# Patient Record
Sex: FEMALE | Race: WHITE | NOT HISPANIC OR LATINO | URBAN - METROPOLITAN AREA
[De-identification: names, ages, dates, MRNs, and addresses within clinical notes are randomized per-mention and may not be internally consistent; named-entity substitution may affect disease eponyms.]

---

## 2018-10-23 ENCOUNTER — EMERGENCY (EMERGENCY)
Facility: HOSPITAL | Age: 73
LOS: 1 days | Discharge: HOME | End: 2018-10-23
Attending: EMERGENCY MEDICINE
Payer: MEDICARE

## 2018-10-23 VITALS
OXYGEN SATURATION: 96 % | RESPIRATION RATE: 18 BRPM | HEART RATE: 70 BPM | TEMPERATURE: 98 F | SYSTOLIC BLOOD PRESSURE: 120 MMHG | DIASTOLIC BLOOD PRESSURE: 78 MMHG

## 2018-10-23 DIAGNOSIS — Z98.890 OTHER SPECIFIED POSTPROCEDURAL STATES: Chronic | ICD-10-CM

## 2018-10-23 PROCEDURE — 99283 EMERGENCY DEPT VISIT LOW MDM: CPT

## 2018-10-23 NOTE — ED PROVIDER NOTE - PLAN OF CARE
The patient was discharged from the ED in stable condition. All results of today's workup were discussed with the patient and all questions/concerns were addressed. All discharge instructions were thoroughly verbally discussed with the patient, as well as important warning signs and new/ worsening symptoms which should necessitate patient's immediate return to the ED. The patient is agreeable with discharge and expresses full understanding of all instructions given.

## 2018-10-23 NOTE — ED PROVIDER NOTE - PROGRESS NOTE DETAILS
Rosalia Hernandez MD PGY-1 Will place pt in ACE bandage for compression. Advised to avoid compression stockings until sees vascular surgeon in 24-48 hours, as compression stockings may worsen symptoms. No active bleeding. Safe for d/c with f/u

## 2018-10-23 NOTE — ED ADULT NURSE NOTE - NSIMPLEMENTINTERV_GEN_ALL_ED
Implemented All Fall Risk Interventions:  [place] to call system. Call bell, personal items and telephone within reach. Instruct patient to call for assistance. Room bathroom lighting operational. Non-slip footwear when patient is off stretcher. Physically safe environment: no spills, clutter or unnecessary equipment. Stretcher in lowest position, wheels locked, appropriate side rails in place. Provide visual cue, wrist band, yellow gown, etc. Monitor gait and stability. Monitor for mental status changes and reorient to person, place, and time. Review medications for side effects contributing to fall risk. Reinforce activity limits and safety measures with patient and family.

## 2018-10-23 NOTE — ED PROCEDURE NOTE - PROCEDURE ADDITIONAL DETAILS
Patient tolerated procedure well. Neurovascularly intact pre and post ACE bandage. Instructed to maintain ACE bandage for compression until follow up with vascular surgeon within 1-2 days.

## 2018-10-23 NOTE — ED ADULT NURSE NOTE - OBJECTIVE STATEMENT
74 yo presents to the ED from home by EMS with daughter at bedside. A&Ox4 c/o L varicose vein bleeding. pt reports that she was putting her socks on at 1845 and "heard a pop and saw a lot of blood". pt reports bleeding stopped with pressure applied. pt denies numbness, tingling. PMS intact bilaterally. on aspirin 81mg daily. reports dizziness. gross neuro intact. denies CP. VSS. well appearing.

## 2018-10-23 NOTE — ED PROVIDER NOTE - OBJECTIVE STATEMENT
Rosalia Hernandez MD PGY-1 Pt is a 72 y/o female with PMH HLD (on lipitor), gastritis, hx cardiac ablation, on ASA presents BIBEMS for "popped varicose veins". Pt states that around 7PM she was putting her socks on, and felt a "gush", resulting in a lot of bleeding from site, per daughter. Called EMS, bleeding controlled with direct pressure in field by EMS. Never had this happen before, was feeling anxious at time of bleeding. States noticed "blue/black" lesion at site of bleeding for past few months, not increasing in size. Denies chest pain, SOB, dizziness prior to event.

## 2018-10-23 NOTE — ED PROVIDER NOTE - CARE PLAN
Principal Discharge DX:	Varicose veins with complications Principal Discharge DX:	Varicose veins with complications  Assessment and plan of treatment:	The patient was discharged from the ED in stable condition. All results of today's workup were discussed with the patient and all questions/concerns were addressed. All discharge instructions were thoroughly verbally discussed with the patient, as well as important warning signs and new/ worsening symptoms which should necessitate patient's immediate return to the ED. The patient is agreeable with discharge and expresses full understanding of all instructions given. Principal Discharge DX:	Varicose veins of left lower extremity, unspecified whether complicated  Assessment and plan of treatment:	The patient was discharged from the ED in stable condition. All results of today's workup were discussed with the patient and all questions/concerns were addressed. All discharge instructions were thoroughly verbally discussed with the patient, as well as important warning signs and new/ worsening symptoms which should necessitate patient's immediate return to the ED. The patient is agreeable with discharge and expresses full understanding of all instructions given.

## 2018-10-23 NOTE — ED PROVIDER NOTE - PHYSICAL EXAMINATION
PHYSICAL EXAM:   General: well-appearing female  who appears stated age, in no acute distress  HEENT: normocephalic, atraumatic, no conjunctival erythema,   Cardiovascular: + S1/S2,   Respiratory: clear to auscultation bilaterally, nonlabored respirations  Abdominal: soft, nontender  Extremities: +LE varicose veins b/l, L>R. Punctate lesion with dried blood on skin over left lateral malleolus. No active bleeding. Motor, sensation and DP pulses in tact  Neuro: Alert and oriented x3.   Psychiatric: appropriate mood and affect.   Skin: appropriate color, warm, dry, except as noted above  -Rosalia Hernandez PGY-1

## 2018-10-23 NOTE — ED PROVIDER NOTE - NS ED ROS FT
REVIEW OF SYSTEMS:  General: no fever, no chills  HEENT: no headache  Cardiac: no chest pain  Respiratory: no shortness of breath  Gastrointestinal: no abdominal pain, no nausea, no vomiting,   Extremities: +LE varicose veins, L>R  Neuro: no numbness/tingling of the extremities, no decreased sensation  Endo: no known diabetes  Skin: no rashes  -Rosalia Hernandez PGY-1

## 2018-10-23 NOTE — ED PROVIDER NOTE - MEDICAL DECISION MAKING DETAILS
Rosalia Hernandez MD PGY-1 74 y/o female PMH cardiac ablation, gastritis, HTN on ASA BIBEMS for "popped varicose veins". Bleeding controlled with direct pressure by EMS. Will perform wound care.

## 2018-10-23 NOTE — ED PROVIDER NOTE - PRINCIPAL DIAGNOSIS
Varicose veins with complications Varicose veins of left lower extremity, unspecified whether complicated

## 2021-03-20 ENCOUNTER — EMERGENCY (EMERGENCY)
Facility: HOSPITAL | Age: 76
LOS: 1 days | Discharge: HOME | End: 2021-03-20
Attending: STUDENT IN AN ORGANIZED HEALTH CARE EDUCATION/TRAINING PROGRAM
Payer: MEDICARE

## 2021-03-20 VITALS
RESPIRATION RATE: 18 BRPM | SYSTOLIC BLOOD PRESSURE: 114 MMHG | WEIGHT: 182.1 LBS | HEIGHT: 63 IN | HEART RATE: 86 BPM | DIASTOLIC BLOOD PRESSURE: 74 MMHG | TEMPERATURE: 99 F | OXYGEN SATURATION: 98 %

## 2021-03-20 DIAGNOSIS — Z98.890 OTHER SPECIFIED POSTPROCEDURAL STATES: Chronic | ICD-10-CM

## 2021-03-20 PROBLEM — F32.9 MAJOR DEPRESSIVE DISORDER, SINGLE EPISODE, UNSPECIFIED: Chronic | Status: ACTIVE | Noted: 2018-10-23

## 2021-03-20 PROBLEM — F41.9 ANXIETY DISORDER, UNSPECIFIED: Chronic | Status: ACTIVE | Noted: 2018-10-23

## 2021-03-20 PROBLEM — E78.5 HYPERLIPIDEMIA, UNSPECIFIED: Chronic | Status: ACTIVE | Noted: 2018-10-23

## 2021-03-20 LAB
ALBUMIN SERPL ELPH-MCNC: 4.4 G/DL — SIGNIFICANT CHANGE UP (ref 3.3–5)
ALP SERPL-CCNC: 133 U/L — HIGH (ref 40–120)
ALT FLD-CCNC: 34 U/L — SIGNIFICANT CHANGE UP (ref 10–45)
ANION GAP SERPL CALC-SCNC: 14 MMOL/L — SIGNIFICANT CHANGE UP (ref 5–17)
APPEARANCE UR: CLEAR — SIGNIFICANT CHANGE UP
APTT BLD: 29.4 SEC — SIGNIFICANT CHANGE UP (ref 27.5–35.5)
AST SERPL-CCNC: 42 U/L — HIGH (ref 10–40)
BACTERIA # UR AUTO: NEGATIVE — SIGNIFICANT CHANGE UP
BASOPHILS # BLD AUTO: 0.03 K/UL — SIGNIFICANT CHANGE UP (ref 0–0.2)
BASOPHILS NFR BLD AUTO: 0.7 % — SIGNIFICANT CHANGE UP (ref 0–2)
BILIRUB SERPL-MCNC: 0.5 MG/DL — SIGNIFICANT CHANGE UP (ref 0.2–1.2)
BILIRUB UR-MCNC: NEGATIVE — SIGNIFICANT CHANGE UP
BUN SERPL-MCNC: 12 MG/DL — SIGNIFICANT CHANGE UP (ref 7–23)
CALCIUM SERPL-MCNC: 9.7 MG/DL — SIGNIFICANT CHANGE UP (ref 8.4–10.5)
CHLORIDE SERPL-SCNC: 100 MMOL/L — SIGNIFICANT CHANGE UP (ref 96–108)
CO2 SERPL-SCNC: 23 MMOL/L — SIGNIFICANT CHANGE UP (ref 22–31)
COLOR SPEC: YELLOW — SIGNIFICANT CHANGE UP
CREAT SERPL-MCNC: 0.91 MG/DL — SIGNIFICANT CHANGE UP (ref 0.5–1.3)
CRP SERPL-MCNC: 0.76 MG/DL — HIGH (ref 0–0.4)
D DIMER BLD IA.RAPID-MCNC: 1569 NG/ML DDU — HIGH
DIFF PNL FLD: NEGATIVE — SIGNIFICANT CHANGE UP
EOSINOPHIL # BLD AUTO: 0.09 K/UL — SIGNIFICANT CHANGE UP (ref 0–0.5)
EOSINOPHIL NFR BLD AUTO: 2.2 % — SIGNIFICANT CHANGE UP (ref 0–6)
EPI CELLS # UR: 1 /HPF — SIGNIFICANT CHANGE UP
GLUCOSE SERPL-MCNC: 98 MG/DL — SIGNIFICANT CHANGE UP (ref 70–99)
GLUCOSE UR QL: NEGATIVE — SIGNIFICANT CHANGE UP
HCT VFR BLD CALC: 37.9 % — SIGNIFICANT CHANGE UP (ref 34.5–45)
HGB BLD-MCNC: 12.4 G/DL — SIGNIFICANT CHANGE UP (ref 11.5–15.5)
HYALINE CASTS # UR AUTO: 1 /LPF — SIGNIFICANT CHANGE UP (ref 0–2)
IMM GRANULOCYTES NFR BLD AUTO: 0.7 % — SIGNIFICANT CHANGE UP (ref 0–1.5)
INR BLD: 1.16 RATIO — SIGNIFICANT CHANGE UP (ref 0.88–1.16)
KETONES UR-MCNC: NEGATIVE — SIGNIFICANT CHANGE UP
LEUKOCYTE ESTERASE UR-ACNC: NEGATIVE — SIGNIFICANT CHANGE UP
LYMPHOCYTES # BLD AUTO: 0.97 K/UL — LOW (ref 1–3.3)
LYMPHOCYTES # BLD AUTO: 23.4 % — SIGNIFICANT CHANGE UP (ref 13–44)
MCHC RBC-ENTMCNC: 25.4 PG — LOW (ref 27–34)
MCHC RBC-ENTMCNC: 32.7 GM/DL — SIGNIFICANT CHANGE UP (ref 32–36)
MCV RBC AUTO: 77.5 FL — LOW (ref 80–100)
MONOCYTES # BLD AUTO: 0.51 K/UL — SIGNIFICANT CHANGE UP (ref 0–0.9)
MONOCYTES NFR BLD AUTO: 12.3 % — SIGNIFICANT CHANGE UP (ref 2–14)
NEUTROPHILS # BLD AUTO: 2.52 K/UL — SIGNIFICANT CHANGE UP (ref 1.8–7.4)
NEUTROPHILS NFR BLD AUTO: 60.7 % — SIGNIFICANT CHANGE UP (ref 43–77)
NITRITE UR-MCNC: NEGATIVE — SIGNIFICANT CHANGE UP
NRBC # BLD: 0 /100 WBCS — SIGNIFICANT CHANGE UP (ref 0–0)
PH UR: 6 — SIGNIFICANT CHANGE UP (ref 5–8)
PLATELET # BLD AUTO: 282 K/UL — SIGNIFICANT CHANGE UP (ref 150–400)
POTASSIUM SERPL-MCNC: 3.7 MMOL/L — SIGNIFICANT CHANGE UP (ref 3.5–5.3)
POTASSIUM SERPL-SCNC: 3.7 MMOL/L — SIGNIFICANT CHANGE UP (ref 3.5–5.3)
PROCALCITONIN SERPL-MCNC: 0.18 NG/ML — HIGH (ref 0.02–0.1)
PROT SERPL-MCNC: 7.5 G/DL — SIGNIFICANT CHANGE UP (ref 6–8.3)
PROT UR-MCNC: NEGATIVE — SIGNIFICANT CHANGE UP
PROTHROM AB SERPL-ACNC: 13.8 SEC — HIGH (ref 10.6–13.6)
RBC # BLD: 4.89 M/UL — SIGNIFICANT CHANGE UP (ref 3.8–5.2)
RBC # FLD: 13.4 % — SIGNIFICANT CHANGE UP (ref 10.3–14.5)
RBC CASTS # UR COMP ASSIST: 1 /HPF — SIGNIFICANT CHANGE UP (ref 0–4)
SODIUM SERPL-SCNC: 137 MMOL/L — SIGNIFICANT CHANGE UP (ref 135–145)
SP GR SPEC: 1.01 — SIGNIFICANT CHANGE UP (ref 1.01–1.02)
TROPONIN T, HIGH SENSITIVITY RESULT: 7 NG/L — SIGNIFICANT CHANGE UP (ref 0–51)
UROBILINOGEN FLD QL: NEGATIVE — SIGNIFICANT CHANGE UP
WBC # BLD: 4.15 K/UL — SIGNIFICANT CHANGE UP (ref 3.8–10.5)
WBC # FLD AUTO: 4.15 K/UL — SIGNIFICANT CHANGE UP (ref 3.8–10.5)
WBC UR QL: 2 /HPF — SIGNIFICANT CHANGE UP (ref 0–5)

## 2021-03-20 PROCEDURE — 71045 X-RAY EXAM CHEST 1 VIEW: CPT | Mod: 26

## 2021-03-20 PROCEDURE — 99285 EMERGENCY DEPT VISIT HI MDM: CPT | Mod: CS

## 2021-03-20 PROCEDURE — 93010 ELECTROCARDIOGRAM REPORT: CPT

## 2021-03-20 RX ORDER — FAMOTIDINE 10 MG/ML
20 INJECTION INTRAVENOUS ONCE
Refills: 0 | Status: COMPLETED | OUTPATIENT
Start: 2021-03-20 | End: 2021-03-20

## 2021-03-20 RX ORDER — SODIUM CHLORIDE 9 MG/ML
1000 INJECTION INTRAMUSCULAR; INTRAVENOUS; SUBCUTANEOUS ONCE
Refills: 0 | Status: COMPLETED | OUTPATIENT
Start: 2021-03-20 | End: 2021-03-20

## 2021-03-20 RX ORDER — ONDANSETRON 8 MG/1
4 TABLET, FILM COATED ORAL ONCE
Refills: 0 | Status: COMPLETED | OUTPATIENT
Start: 2021-03-20 | End: 2021-03-20

## 2021-03-20 RX ADMIN — SODIUM CHLORIDE 1000 MILLILITER(S): 9 INJECTION INTRAMUSCULAR; INTRAVENOUS; SUBCUTANEOUS at 21:52

## 2021-03-20 RX ADMIN — ONDANSETRON 4 MILLIGRAM(S): 8 TABLET, FILM COATED ORAL at 20:18

## 2021-03-20 RX ADMIN — FAMOTIDINE 20 MILLIGRAM(S): 10 INJECTION INTRAVENOUS at 20:18

## 2021-03-20 NOTE — ED PROVIDER NOTE - PATIENT PORTAL LINK FT
You can access the FollowMyHealth Patient Portal offered by [facility] by registering at the following website: [email or web address]. By joining [employer]’s FollowMyHealth portal, you will also be able to view your health information using other applications (apps) compatible with our system.

## 2021-03-20 NOTE — ED PROVIDER NOTE - NSFOLLOWUPINSTRUCTIONS_ED_ALL_ED_FT
Please follow up with pulmonary over the next 2-4 weeks for further management of the findings seen on the CT scan of the chest.   If you develop difficulty breathing, lightheadedness, dizziness, feeling like you will pass out, nausea vomiting, confusion or any other concerning symptoms please return to the emergency department.

## 2021-03-20 NOTE — ED PROVIDER NOTE - NSFOLLOWUPCLINICS_GEN_ALL_ED_FT
[facility]  Pulmonary Medicine  [address] [place] [address]  Phone: [phone]  Fax: [phone]  Follow Up Time:

## 2021-03-20 NOTE — ED PROVIDER NOTE - PROGRESS NOTE DETAILS
Pt stated feeling much better after medications and awaiting for CTA chest. ap- spoke w/ daughter and patient who both prefer to go home, pt currently feeling much improved while in the hospital, prefers to go home w/ outpt follow up. Will give dose of antibiotics while in the er. Pt w/ no cp, no sob, is ambulatory in the dpeartment w/out difficulty, pt curb 65 of 1 will trial outpt antibiotics, pt to follow up with pulmonary

## 2021-03-20 NOTE — ED PROVIDER NOTE - CLINICAL SUMMARY MEDICAL DECISION MAKING FREE TEXT BOX
75 F w/ hx of HTN, HLD, anxiety and depresison p/w cough, cp/back pain poor oral intake and nausea for 1 week reports sx started on 3/13, has had negative rapid covid test, went to  on 3/13--> returned on 3/15 for results and had a UA w/ <50k strep, was on azithromycin pt family friend, pt w/ decreased po intake, no cp, no sob, brought in by daughter to the er to "figure out what is wrong" "run all the tests" pt w/ fatigue, concern for possible covid 19 infection, lungs w/ decreased breath sounds bilaterally, no abodminal tenderness, no cva tenderness, ambulatory in the department w/out difficulty, no TATUM, pt to have screening covid 19 labs, pt w/ elevated ddimer, will have labs, ekg and CTPE scan, UA negative for infection, pt to be treated w/ oral antibiotics for possible bacterial pneumonia, PT to follow up with pulmonary for further management of symptoms

## 2021-03-20 NOTE — ED PROVIDER NOTE - PHYSICAL EXAMINATION
NAD, VSS, Afebrile, Neck supple. Lungs clear. Mild epigastric tender. No RUQ/LUQ/RLQ and LLQ tender. No CVA tender. No peripheral edema. Neuro- intact.

## 2021-03-20 NOTE — ED PROVIDER NOTE - ATTENDING CONTRIBUTION TO CARE
12 days hasn't been eating, decreased   started a z pack and ended today   huber (daughter) [phone]        will have labs, and xray

## 2021-03-20 NOTE — ED PROVIDER NOTE - DISCHARGE DATE
Pt BIB via EMS, c/o generalized weakness with decrease appetite w/ hx of COPD and asthma. Pt was at PCP went felt weak , low BP at office sent here for evaluation. In no acute distress, denies feeling SOB or difficulty breathing. shyla starr 21-Mar-2021

## 2021-03-20 NOTE — ED PROVIDER NOTE - OBJECTIVE STATEMENT
76yo female pt with pMHx of HTN, HLD, Anxiety/Depression presents to ED with cough, CP/back pain, poor oral intake and nausea for one week. Pt stated she's had cough, poor oral intake, nausea with CP/back pain since 3/13/21 and tested COVID- in UC. She spoked to PMD 5days ago and completed Z-pack today with some improvement. Reported she's still unable to tolerate po intake due to nausea and upset stomach and feeling weak. Denies fever, chills, or sore throat. Denies SOB. Denies V/D. Denies urinary problems. Denies sensory changes or weakness to extremities.

## 2021-03-21 VITALS
TEMPERATURE: 98 F | SYSTOLIC BLOOD PRESSURE: 118 MMHG | HEART RATE: 84 BPM | DIASTOLIC BLOOD PRESSURE: 88 MMHG | OXYGEN SATURATION: 99 % | RESPIRATION RATE: 17 BRPM

## 2021-03-21 LAB — SARS-COV-2 RNA SPEC QL NAA+PROBE: SIGNIFICANT CHANGE UP

## 2021-03-21 PROCEDURE — 84484 ASSAY OF TROPONIN QUANT: CPT

## 2021-03-21 PROCEDURE — 85610 PROTHROMBIN TIME: CPT

## 2021-03-21 PROCEDURE — 93005 ELECTROCARDIOGRAM TRACING: CPT

## 2021-03-21 PROCEDURE — 86140 C-REACTIVE PROTEIN: CPT

## 2021-03-21 PROCEDURE — 96375 TX/PRO/DX INJ NEW DRUG ADDON: CPT

## 2021-03-21 PROCEDURE — 84145 PROCALCITONIN (PCT): CPT

## 2021-03-21 PROCEDURE — 71045 X-RAY EXAM CHEST 1 VIEW: CPT

## 2021-03-21 PROCEDURE — U0003: CPT

## 2021-03-21 PROCEDURE — 85025 COMPLETE CBC W/AUTO DIFF WBC: CPT

## 2021-03-21 PROCEDURE — 71275 CT ANGIOGRAPHY CHEST: CPT | Mod: 26,MA

## 2021-03-21 PROCEDURE — 85379 FIBRIN DEGRADATION QUANT: CPT

## 2021-03-21 PROCEDURE — 85730 THROMBOPLASTIN TIME PARTIAL: CPT

## 2021-03-21 PROCEDURE — 96374 THER/PROPH/DIAG INJ IV PUSH: CPT | Mod: XU

## 2021-03-21 PROCEDURE — U0005: CPT

## 2021-03-21 PROCEDURE — 81001 URINALYSIS AUTO W/SCOPE: CPT

## 2021-03-21 PROCEDURE — 71275 CT ANGIOGRAPHY CHEST: CPT

## 2021-03-21 PROCEDURE — 99285 EMERGENCY DEPT VISIT HI MDM: CPT | Mod: 25

## 2021-03-21 PROCEDURE — 80053 COMPREHEN METABOLIC PANEL: CPT

## 2021-03-21 PROCEDURE — 83690 ASSAY OF LIPASE: CPT

## 2021-03-21 PROCEDURE — 87086 URINE CULTURE/COLONY COUNT: CPT

## 2021-03-21 RX ORDER — CEFTRIAXONE 500 MG/1
1000 INJECTION, POWDER, FOR SOLUTION INTRAMUSCULAR; INTRAVENOUS ONCE
Refills: 0 | Status: COMPLETED | OUTPATIENT
Start: 2021-03-21 | End: 2021-03-21

## 2021-03-21 RX ORDER — CEFPODOXIME PROXETIL 100 MG
1 TABLET ORAL
Qty: 14 | Refills: 0
Start: 2021-03-21 | End: 2021-03-27

## 2021-03-21 RX ADMIN — CEFTRIAXONE 100 MILLIGRAM(S): 500 INJECTION, POWDER, FOR SOLUTION INTRAMUSCULAR; INTRAVENOUS at 02:18

## 2021-03-21 RX ADMIN — Medication 100 MILLIGRAM(S): at 02:18

## 2021-03-22 LAB
CULTURE RESULTS: SIGNIFICANT CHANGE UP
SPECIMEN SOURCE: SIGNIFICANT CHANGE UP

## 2021-03-31 PROBLEM — Z00.00 ENCOUNTER FOR PREVENTIVE HEALTH EXAMINATION: Status: ACTIVE | Noted: 2021-03-31

## 2021-04-01 ENCOUNTER — APPOINTMENT (OUTPATIENT)
Dept: PULMONOLOGY | Facility: CLINIC | Age: 76
End: 2021-04-01
Payer: MEDICARE

## 2021-04-01 VITALS
OXYGEN SATURATION: 99 % | HEART RATE: 78 BPM | HEIGHT: 64 IN | RESPIRATION RATE: 16 BRPM | WEIGHT: 173 LBS | DIASTOLIC BLOOD PRESSURE: 74 MMHG | BODY MASS INDEX: 29.53 KG/M2 | SYSTOLIC BLOOD PRESSURE: 109 MMHG | TEMPERATURE: 97.6 F

## 2021-04-01 DIAGNOSIS — R42 DIZZINESS AND GIDDINESS: ICD-10-CM

## 2021-04-01 DIAGNOSIS — Z87.19 PERSONAL HISTORY OF OTHER DISEASES OF THE DIGESTIVE SYSTEM: ICD-10-CM

## 2021-04-01 DIAGNOSIS — Z86.79 PERSONAL HISTORY OF OTHER DISEASES OF THE CIRCULATORY SYSTEM: ICD-10-CM

## 2021-04-01 DIAGNOSIS — Z86.39 PERSONAL HISTORY OF OTHER ENDOCRINE, NUTRITIONAL AND METABOLIC DISEASE: ICD-10-CM

## 2021-04-01 DIAGNOSIS — R06.02 SHORTNESS OF BREATH: ICD-10-CM

## 2021-04-01 PROCEDURE — 99204 OFFICE O/P NEW MOD 45 MIN: CPT

## 2021-04-01 RX ORDER — OLMESARTAN MEDOXOMIL-HYDROCHLOROTHIAZIDE 25; 40 MG/1; MG/1
40-25 TABLET, FILM COATED ORAL
Refills: 0 | Status: ACTIVE | COMMUNITY

## 2021-04-01 NOTE — ASSESSMENT
[FreeTextEntry1] : 75F with cough and chest tightness, found to have multifocal infiltrates on CTA in ED, improved clinically with abx.\par Doing well from pulmonary standpoint, c/o persistent lightheadedness.\par All labs and imaging from 4/20 visit to ED reviewed.\par +orthostasis\par could be dehydration or over-medication\par recommend to hold BP meds and monitor\par recommend immediate f/u with her PMD\par RTC in 4 weeks for pulm reassessment and reimaging

## 2021-04-01 NOTE — HISTORY OF PRESENT ILLNESS
[TextBox_4] : 75F here for visit after ED visit at [facility] for sob and cough. \par Pt has been unwell since march 10, with weakness and fatigue and lightheadedness, malaise, poor appetite. COVID negative x2. Went to ED on 3/20 due to chest pressure and cough. Had a CTA which was negative for PE and releaved bilateral [patchy infiltrates and pt sent home on cefpodoxime and doxy. She has improved from pulmonary standpoint and feels better, but her weakness and lightheadedness have persisted.\par NO cough, no sob, no wheezing, no fevers.

## 2021-04-05 ENCOUNTER — NON-APPOINTMENT (OUTPATIENT)
Age: 76
End: 2021-04-05

## 2021-04-05 LAB
ALBUMIN SERPL ELPH-MCNC: 4.5 G/DL
ALP BLD-CCNC: 116 U/L
ALT SERPL-CCNC: 46 U/L
ANION GAP SERPL CALC-SCNC: 12 MMOL/L
AST SERPL-CCNC: 40 U/L
BASOPHILS # BLD AUTO: 0.04 K/UL
BASOPHILS NFR BLD AUTO: 0.5 %
BILIRUB SERPL-MCNC: 0.4 MG/DL
BUN SERPL-MCNC: 21 MG/DL
CALCIUM SERPL-MCNC: 10.2 MG/DL
CHLORIDE SERPL-SCNC: 105 MMOL/L
CO2 SERPL-SCNC: 24 MMOL/L
COVID-19 NUCLEOCAPSID  GAM ANTIBODY INTERPRETATION: NEGATIVE
CREAT SERPL-MCNC: 1.08 MG/DL
CRP SERPL-MCNC: <3 MG/L
DEPRECATED D DIMER PPP IA-ACNC: 2139 NG/ML DDU
EOSINOPHIL # BLD AUTO: 0.11 K/UL
EOSINOPHIL NFR BLD AUTO: 1.3 %
GLUCOSE SERPL-MCNC: 87 MG/DL
HCT VFR BLD CALC: 39.9 %
HGB BLD-MCNC: 11.8 G/DL
IMM GRANULOCYTES NFR BLD AUTO: 0.4 %
LYMPHOCYTES # BLD AUTO: 2.58 K/UL
LYMPHOCYTES NFR BLD AUTO: 31 %
MAN DIFF?: NORMAL
MCHC RBC-ENTMCNC: 25.2 PG
MCHC RBC-ENTMCNC: 29.6 GM/DL
MCV RBC AUTO: 85.1 FL
MONOCYTES # BLD AUTO: 0.94 K/UL
MONOCYTES NFR BLD AUTO: 11.3 %
NEUTROPHILS # BLD AUTO: 4.63 K/UL
NEUTROPHILS NFR BLD AUTO: 55.5 %
PLATELET # BLD AUTO: 313 K/UL
POTASSIUM SERPL-SCNC: 4.5 MMOL/L
PROCALCITONIN SERPL-MCNC: 0.08 NG/ML
PROT SERPL-MCNC: 7.2 G/DL
RBC # BLD: 4.69 M/UL
RBC # FLD: 15.3 %
SARS-COV-2 AB SERPL QL IA: 0.08 INDEX
SODIUM SERPL-SCNC: 141 MMOL/L
WBC # FLD AUTO: 8.33 K/UL

## 2021-04-27 ENCOUNTER — APPOINTMENT (OUTPATIENT)
Dept: PULMONOLOGY | Facility: CLINIC | Age: 76
End: 2021-04-27
Payer: MEDICARE

## 2021-04-27 VITALS
BODY MASS INDEX: 29.53 KG/M2 | HEART RATE: 63 BPM | SYSTOLIC BLOOD PRESSURE: 135 MMHG | WEIGHT: 173 LBS | HEIGHT: 64 IN | DIASTOLIC BLOOD PRESSURE: 81 MMHG

## 2021-04-27 VITALS — TEMPERATURE: 96.7 F

## 2021-04-27 DIAGNOSIS — J18.9 PNEUMONIA, UNSPECIFIED ORGANISM: ICD-10-CM

## 2021-04-27 DIAGNOSIS — R93.89 ABNORMAL FINDINGS ON DIAGNOSTIC IMAGING OF OTHER SPECIFIED BODY STRUCTURES: ICD-10-CM

## 2021-04-27 PROCEDURE — 99214 OFFICE O/P EST MOD 30 MIN: CPT

## 2021-04-28 PROBLEM — R93.89 ABNORMAL CT OF THE CHEST: Status: ACTIVE | Noted: 2021-04-28

## 2021-04-28 PROBLEM — J18.9 COMMUNITY ACQUIRED PNEUMONIA: Status: ACTIVE | Noted: 2021-04-01

## 2021-04-28 NOTE — ASSESSMENT
[FreeTextEntry1] : 75F with cough and chest tightness, found to have multifocal infiltrates on CTA in ED, improved clinically with abx.\par Doing well from pulmonary standpoint\par \par f/u CT chest for resolution of ill defined pulm infiltrates.

## 2021-04-28 NOTE — HISTORY OF PRESENT ILLNESS
[TextBox_4] : f/u 4/27/21:\par Pt here for f/u. Since she saw me, pt has been home recovering. Received another course of abx for UTI and some IV vitamin infusions.\par She reports feeling better, back to her normal baseline. NO sob or shoemaker, no cough.\par \par \par Initial visit:\par 75F here for visit after ED visit at [facility] for sob and cough. \par Pt has been unwell since march 10, with weakness and fatigue and lightheadedness, malaise, poor appetite. COVID negative x2. Went to ED on 3/20 due to chest pressure and cough. Had a CTA which was negative for PE and releaved bilateral [patchy infiltrates and pt sent home on cefpodoxime and doxy. She has improved from pulmonary standpoint and feels better, but her weakness and lightheadedness have persisted.\par NO cough, no sob, no wheezing, no fevers.

## 2021-07-30 ENCOUNTER — EMERGENCY (EMERGENCY)
Facility: HOSPITAL | Age: 76
LOS: 1 days | Discharge: HOME | End: 2021-07-30
Attending: EMERGENCY MEDICINE
Payer: MEDICARE

## 2021-07-30 VITALS
SYSTOLIC BLOOD PRESSURE: 131 MMHG | HEART RATE: 60 BPM | RESPIRATION RATE: 18 BRPM | TEMPERATURE: 98 F | DIASTOLIC BLOOD PRESSURE: 81 MMHG | OXYGEN SATURATION: 99 %

## 2021-07-30 VITALS
SYSTOLIC BLOOD PRESSURE: 133 MMHG | DIASTOLIC BLOOD PRESSURE: 82 MMHG | RESPIRATION RATE: 18 BRPM | TEMPERATURE: 98 F | OXYGEN SATURATION: 95 % | WEIGHT: 167.55 LBS | HEART RATE: 89 BPM | HEIGHT: 63 IN

## 2021-07-30 DIAGNOSIS — Z98.890 OTHER SPECIFIED POSTPROCEDURAL STATES: Chronic | ICD-10-CM

## 2021-07-30 LAB
ALBUMIN SERPL ELPH-MCNC: 3.6 G/DL — SIGNIFICANT CHANGE UP (ref 3.5–5)
ALP SERPL-CCNC: 106 U/L — SIGNIFICANT CHANGE UP (ref 40–120)
ALT FLD-CCNC: 40 U/L DA — SIGNIFICANT CHANGE UP (ref 10–60)
ANION GAP SERPL CALC-SCNC: 6 MMOL/L — SIGNIFICANT CHANGE UP (ref 5–17)
APPEARANCE UR: CLEAR — SIGNIFICANT CHANGE UP
AST SERPL-CCNC: 28 U/L — SIGNIFICANT CHANGE UP (ref 10–40)
BACTERIA # UR AUTO: NEGATIVE /HPF — SIGNIFICANT CHANGE UP
BASOPHILS # BLD AUTO: 0.01 K/UL — SIGNIFICANT CHANGE UP (ref 0–0.2)
BASOPHILS NFR BLD AUTO: 0.2 % — SIGNIFICANT CHANGE UP (ref 0–2)
BILIRUB SERPL-MCNC: 0.4 MG/DL — SIGNIFICANT CHANGE UP (ref 0.2–1.2)
BILIRUB UR-MCNC: NEGATIVE — SIGNIFICANT CHANGE UP
BUN SERPL-MCNC: 15 MG/DL — SIGNIFICANT CHANGE UP (ref 7–18)
CALCIUM SERPL-MCNC: 9.6 MG/DL — SIGNIFICANT CHANGE UP (ref 8.4–10.5)
CHLORIDE SERPL-SCNC: 107 MMOL/L — SIGNIFICANT CHANGE UP (ref 96–108)
CO2 SERPL-SCNC: 29 MMOL/L — SIGNIFICANT CHANGE UP (ref 22–31)
COLOR SPEC: YELLOW — SIGNIFICANT CHANGE UP
COMMENT - URINE: SIGNIFICANT CHANGE UP
CREAT SERPL-MCNC: 0.99 MG/DL — SIGNIFICANT CHANGE UP (ref 0.5–1.3)
DIFF PNL FLD: ABNORMAL
EOSINOPHIL # BLD AUTO: 0.12 K/UL — SIGNIFICANT CHANGE UP (ref 0–0.5)
EOSINOPHIL NFR BLD AUTO: 2.7 % — SIGNIFICANT CHANGE UP (ref 0–6)
EPI CELLS # UR: SIGNIFICANT CHANGE UP /HPF
GLUCOSE SERPL-MCNC: 94 MG/DL — SIGNIFICANT CHANGE UP (ref 70–99)
GLUCOSE UR QL: NEGATIVE — SIGNIFICANT CHANGE UP
HCT VFR BLD CALC: 39.6 % — SIGNIFICANT CHANGE UP (ref 34.5–45)
HGB BLD-MCNC: 12.6 G/DL — SIGNIFICANT CHANGE UP (ref 11.5–15.5)
HMPV RNA SPEC QL NAA+PROBE: DETECTED
IMM GRANULOCYTES NFR BLD AUTO: 0.4 % — SIGNIFICANT CHANGE UP (ref 0–1.5)
KETONES UR-MCNC: NEGATIVE — SIGNIFICANT CHANGE UP
LEUKOCYTE ESTERASE UR-ACNC: ABNORMAL
LYMPHOCYTES # BLD AUTO: 1.21 K/UL — SIGNIFICANT CHANGE UP (ref 1–3.3)
LYMPHOCYTES # BLD AUTO: 27.2 % — SIGNIFICANT CHANGE UP (ref 13–44)
MCHC RBC-ENTMCNC: 25.1 PG — LOW (ref 27–34)
MCHC RBC-ENTMCNC: 31.8 GM/DL — LOW (ref 32–36)
MCV RBC AUTO: 78.9 FL — LOW (ref 80–100)
MONOCYTES # BLD AUTO: 0.44 K/UL — SIGNIFICANT CHANGE UP (ref 0–0.9)
MONOCYTES NFR BLD AUTO: 9.9 % — SIGNIFICANT CHANGE UP (ref 2–14)
NEUTROPHILS # BLD AUTO: 2.65 K/UL — SIGNIFICANT CHANGE UP (ref 1.8–7.4)
NEUTROPHILS NFR BLD AUTO: 59.6 % — SIGNIFICANT CHANGE UP (ref 43–77)
NITRITE UR-MCNC: NEGATIVE — SIGNIFICANT CHANGE UP
NRBC # BLD: 0 /100 WBCS — SIGNIFICANT CHANGE UP (ref 0–0)
NT-PROBNP SERPL-SCNC: 57 PG/ML — SIGNIFICANT CHANGE UP (ref 0–450)
PH UR: 6 — SIGNIFICANT CHANGE UP (ref 5–8)
PLATELET # BLD AUTO: 177 K/UL — SIGNIFICANT CHANGE UP (ref 150–400)
POTASSIUM SERPL-MCNC: 4 MMOL/L — SIGNIFICANT CHANGE UP (ref 3.5–5.3)
POTASSIUM SERPL-SCNC: 4 MMOL/L — SIGNIFICANT CHANGE UP (ref 3.5–5.3)
PROT SERPL-MCNC: 7.5 G/DL — SIGNIFICANT CHANGE UP (ref 6–8.3)
PROT UR-MCNC: 15
RAPID RVP RESULT: DETECTED
RBC # BLD: 5.02 M/UL — SIGNIFICANT CHANGE UP (ref 3.8–5.2)
RBC # FLD: 14.3 % — SIGNIFICANT CHANGE UP (ref 10.3–14.5)
RBC CASTS # UR COMP ASSIST: SIGNIFICANT CHANGE UP /HPF (ref 0–2)
SARS-COV-2 RNA SPEC QL NAA+PROBE: SIGNIFICANT CHANGE UP
SODIUM SERPL-SCNC: 142 MMOL/L — SIGNIFICANT CHANGE UP (ref 135–145)
SP GR SPEC: 1.01 — SIGNIFICANT CHANGE UP (ref 1.01–1.02)
TROPONIN I SERPL-MCNC: <0.015 NG/ML — SIGNIFICANT CHANGE UP (ref 0–0.04)
UROBILINOGEN FLD QL: NEGATIVE — SIGNIFICANT CHANGE UP
WBC # BLD: 4.45 K/UL — SIGNIFICANT CHANGE UP (ref 3.8–10.5)
WBC # FLD AUTO: 4.45 K/UL — SIGNIFICANT CHANGE UP (ref 3.8–10.5)
WBC UR QL: SIGNIFICANT CHANGE UP /HPF (ref 0–5)

## 2021-07-30 PROCEDURE — 82962 GLUCOSE BLOOD TEST: CPT

## 2021-07-30 PROCEDURE — 99284 EMERGENCY DEPT VISIT MOD MDM: CPT | Mod: 25

## 2021-07-30 PROCEDURE — 71045 X-RAY EXAM CHEST 1 VIEW: CPT

## 2021-07-30 PROCEDURE — 80053 COMPREHEN METABOLIC PANEL: CPT

## 2021-07-30 PROCEDURE — 85025 COMPLETE CBC W/AUTO DIFF WBC: CPT

## 2021-07-30 PROCEDURE — 71045 X-RAY EXAM CHEST 1 VIEW: CPT | Mod: 26

## 2021-07-30 PROCEDURE — 84484 ASSAY OF TROPONIN QUANT: CPT

## 2021-07-30 PROCEDURE — 36415 COLL VENOUS BLD VENIPUNCTURE: CPT

## 2021-07-30 PROCEDURE — 93005 ELECTROCARDIOGRAM TRACING: CPT

## 2021-07-30 PROCEDURE — 81001 URINALYSIS AUTO W/SCOPE: CPT

## 2021-07-30 PROCEDURE — 0225U NFCT DS DNA&RNA 21 SARSCOV2: CPT

## 2021-07-30 PROCEDURE — 83880 ASSAY OF NATRIURETIC PEPTIDE: CPT

## 2021-07-30 PROCEDURE — 87086 URINE CULTURE/COLONY COUNT: CPT

## 2021-07-30 PROCEDURE — 99285 EMERGENCY DEPT VISIT HI MDM: CPT | Mod: CS

## 2021-07-30 PROCEDURE — 96374 THER/PROPH/DIAG INJ IV PUSH: CPT

## 2021-07-30 RX ORDER — DEXAMETHASONE 0.5 MG/5ML
10 ELIXIR ORAL ONCE
Refills: 0 | Status: COMPLETED | OUTPATIENT
Start: 2021-07-30 | End: 2021-07-30

## 2021-07-30 RX ORDER — SODIUM CHLORIDE 9 MG/ML
1000 INJECTION INTRAMUSCULAR; INTRAVENOUS; SUBCUTANEOUS ONCE
Refills: 0 | Status: COMPLETED | OUTPATIENT
Start: 2021-07-30 | End: 2021-07-30

## 2021-07-30 RX ADMIN — Medication 100 MILLIGRAM(S): at 10:10

## 2021-07-30 RX ADMIN — SODIUM CHLORIDE 1000 MILLILITER(S): 9 INJECTION INTRAMUSCULAR; INTRAVENOUS; SUBCUTANEOUS at 10:04

## 2021-07-30 RX ADMIN — Medication 102 MILLIGRAM(S): at 10:08

## 2021-07-30 NOTE — ED PROVIDER NOTE - CONDITION AT DISCHARGE:
CATARACTS, OU - VISUALLY SIGNIFICANT. PT TO CALL WHEN READY TO PROCEED WITH SURGERY. SCHEDULE OD__  FIRST THEN LATER IN OS__  IF VISUAL SYMPTOMS PERSIST. Improved

## 2021-07-30 NOTE — ED PROVIDER NOTE - CLINICAL SUMMARY MEDICAL DECISION MAKING FREE TEXT BOX
76 yr old female with hx of varicose veins presents to ed c/o fatigue, cough white phlegm, dizziness,, chest congestion and sob x 1 week. sub fever, no abd pain, no sick contact. no diarrhea. covid vaccinated. PCP gave levaquin and cough syrup    r/o covid vs viral pna vs bacterial pna vs dehydration - less likely chf/pe. labs, ekg, cxr, fluids, decadron, chiara perles, re-assess

## 2021-07-30 NOTE — ED ADULT NURSE NOTE - SUICIDE SCREENING QUESTION 3
Spoke with Tammy JACOB at [facility]. Pt has dialysis appointment tomorrow 3/15 at 1000.    No

## 2021-07-30 NOTE — ED PROVIDER NOTE - PATIENT PORTAL LINK FT
You can access the FollowMyHealth Patient Portal offered by [facility] by registering at the following website: [email or web address]. By joining [employer]’s FollowMyHealth portal, you will also be able to view your health information using other applications (apps) compatible with our system.

## 2021-07-30 NOTE — ED PROVIDER NOTE - NSFOLLOWUPINSTRUCTIONS_ED_ALL_ED_FT
Upper Respiratory Infection, Adult      An upper respiratory infection (URI) affects the nose, throat, and upper air passages. URIs are caused by germs (viruses). The most common type of URI is often called "the common cold."    Medicines cannot cure URIs, but you can do things at home to relieve your symptoms. URIs usually get better within 7–10 days.      Follow these instructions at home:    Activity     •Rest as needed.    •If you have a fever, stay home from work or school until your fever is gone, or until your doctor says you may return to work or school.  •You should stay home until you cannot spread the infection anymore (you are not contagious).      •Your doctor may have you wear a face mask so you have less risk of spreading the infection.        Relieving symptoms     •Gargle with a salt-water mixture 3–4 times a day or as needed. To make a salt-water mixture, completely dissolve ½–1 tsp of salt in 1 cup of warm water.      •Use a cool-mist humidifier to add moisture to the air. This can help you breathe more easily.        Eating and drinking      •Drink enough fluid to keep your pee (urine) pale yellow.      •Eat soups and other clear broths.        General instructions      •Take over-the-counter and prescription medicines only as told by your doctor. These include cold medicines, fever reducers, and cough suppressants.      • Do not use any products that contain nicotine or tobacco. These include cigarettes and e-cigarettes. If you need help quitting, ask your doctor.      •Avoid being where people are smoking (avoid secondhand smoke).      •Make sure you get regular shots and get the flu shot every year.      •Keep all follow-up visits as told by your doctor. This is important.        How to avoid spreading infection to others      •Wash your hands often with soap and water. If you do not have soap and water, use hand .      •Avoid touching your mouth, face, eyes, or nose.      •Cough or sneeze into a tissue or your sleeve or elbow. Do not cough or sneeze into your hand or into the air.        Contact a doctor if:    •You are getting worse, not better.    •You have any of these:  •A fever.      •Chills.      •Brown or red mucus in your nose.      •Yellow or brown fluid (discharge)coming from your nose.      •Pain in your face, especially when you bend forward.      •Swollen neck glands.      •Pain with swallowing.      •White areas in the back of your throat.          Get help right away if:    •You have shortness of breath that gets worse.    •You have very bad or constant:  •Headache.      •Ear pain.      •Pain in your forehead, behind your eyes, and over your cheekbones (sinus pain).      •Chest pain.      •You have long-lasting (chronic) lung disease along with any of these:  •Wheezing.      •Long-lasting cough.      •Coughing up blood.      •A change in your usual mucus.        •You have a stiff neck.    •You have changes in your:  •Vision.      •Hearing.      •Thinking.      •Mood.          Summary    •An upper respiratory infection (URI) is caused by a germ called a virus. The most common type of URI is often called "the common cold."      •URIs usually get better within 7–10 days.      •Take over-the-counter and prescription medicines only as told by your doctor.

## 2021-07-30 NOTE — ED PROVIDER NOTE - OBJECTIVE STATEMENT
76 yr old female with hx of varicose veins presents to ed c/o fatigue, cough white phlegm, dizziness,, chest congestion and sob x 1 week. sub fever, no abd pain, no sick contact. no diarrhea. covid vaccinated. PCP gave levaquin and cough syrup

## 2021-07-30 NOTE — ED PROVIDER NOTE - AXIS
Left Deviation Calcipotriene Counseling:  I discussed with the patient the risks of calcipotriene including but not limited to erythema, scaling, itching, and irritation.

## 2021-07-30 NOTE — ED PROVIDER NOTE - PROGRESS NOTE DETAILS
Dillard: work up no acute finding. cxr clear. pt feels better. neurologically intact  dx uri. rx chiara perles. hydrate. see pcp. left ambulatory.  return precautions given.

## 2021-07-31 LAB
CULTURE RESULTS: SIGNIFICANT CHANGE UP
SPECIMEN SOURCE: SIGNIFICANT CHANGE UP

## 2021-10-26 NOTE — ED ADULT NURSE NOTE - DISCHARGE DATE/TIME
----- Message from Don Salas MD sent at 10/26/2021  4:26 PM CDT -----  Liver enzymes are wnl now.  Pt should see podiatry as referred.   23-Oct-2018 21:42

## 2021-12-06 ENCOUNTER — APPOINTMENT (OUTPATIENT)
Dept: VASCULAR SURGERY | Facility: CLINIC | Age: 76
End: 2021-12-06
Payer: MEDICARE

## 2021-12-06 VITALS — TEMPERATURE: 97.3 F

## 2021-12-06 VITALS
DIASTOLIC BLOOD PRESSURE: 78 MMHG | HEIGHT: 64 IN | WEIGHT: 172 LBS | SYSTOLIC BLOOD PRESSURE: 118 MMHG | BODY MASS INDEX: 29.37 KG/M2 | HEART RATE: 81 BPM

## 2021-12-06 PROCEDURE — 93976 VASCULAR STUDY: CPT

## 2021-12-06 PROCEDURE — 99203 OFFICE O/P NEW LOW 30 MIN: CPT

## 2021-12-06 PROCEDURE — 93970 EXTREMITY STUDY: CPT

## 2021-12-06 RX ORDER — DEXLANSOPRAZOLE 60 MG/1
60 CAPSULE, DELAYED RELEASE ORAL
Qty: 30 | Refills: 0 | Status: ACTIVE | COMMUNITY
Start: 2021-06-29

## 2021-12-06 RX ORDER — VORTIOXETINE 10 MG/1
10 TABLET, FILM COATED ORAL
Qty: 30 | Refills: 0 | Status: ACTIVE | COMMUNITY
Start: 2021-06-29

## 2021-12-06 RX ORDER — ATORVASTATIN CALCIUM 40 MG/1
40 TABLET, FILM COATED ORAL
Qty: 30 | Refills: 0 | Status: DISCONTINUED | COMMUNITY
Start: 2021-11-21 | End: 2021-12-06

## 2021-12-06 RX ORDER — DOXEPIN HYDROCHLORIDE 10 MG/1
10 CAPSULE ORAL
Qty: 30 | Refills: 0 | Status: ACTIVE | COMMUNITY
Start: 2021-11-21

## 2021-12-06 RX ORDER — LEVOFLOXACIN 500 MG/1
500 TABLET, FILM COATED ORAL
Qty: 7 | Refills: 0 | Status: ACTIVE | COMMUNITY
Start: 2021-07-27

## 2021-12-06 RX ORDER — MECLIZINE HYDROCHLORIDE 25 MG/1
25 TABLET ORAL
Qty: 60 | Refills: 0 | Status: ACTIVE | COMMUNITY
Start: 2021-06-18

## 2021-12-06 RX ORDER — AMITRIPTYLINE HYDROCHLORIDE 10 MG/1
10 TABLET, FILM COATED ORAL
Qty: 30 | Refills: 0 | Status: DISCONTINUED | COMMUNITY
Start: 2021-06-06 | End: 2021-12-06

## 2021-12-06 RX ORDER — DEXLANSOPRAZOLE 30 MG/1
30 CAPSULE, DELAYED RELEASE ORAL
Refills: 0 | Status: DISCONTINUED | COMMUNITY
End: 2021-12-06

## 2021-12-06 RX ORDER — DOXEPIN 6 MG/1
6 TABLET, FILM COATED ORAL
Qty: 30 | Refills: 0 | Status: DISCONTINUED | COMMUNITY
Start: 2021-06-29 | End: 2021-12-06

## 2021-12-06 RX ORDER — MEMANTINE HYDROCHLORIDE AND DONEPEZIL HYDROCHLORIDE 7; 10 MG/1; MG/1
7-10 CAPSULE ORAL
Qty: 30 | Refills: 0 | Status: ACTIVE | COMMUNITY
Start: 2021-06-29

## 2021-12-06 RX ORDER — BREXPIPRAZOLE 0.5 MG/1
0.5 TABLET ORAL
Qty: 30 | Refills: 0 | Status: ACTIVE | COMMUNITY
Start: 2021-06-29

## 2021-12-06 RX ORDER — DICLOFENAC SODIUM 20 MG/G
2 SOLUTION TOPICAL
Qty: 112 | Refills: 0 | Status: ACTIVE | COMMUNITY
Start: 2021-11-21

## 2021-12-06 RX ORDER — OLMESARTAN MEDOXOMIL 20 MG/1
20 TABLET, FILM COATED ORAL
Qty: 30 | Refills: 0 | Status: DISCONTINUED | COMMUNITY
Start: 2021-06-29 | End: 2021-12-06

## 2023-04-04 NOTE — ED ADULT NURSE NOTE - NS ED NURSE LEVEL OF CONSCIOUSNESS MENTAL STATUS
Procedure to be done Right lumbo-sacral radiofrequency cooled ablation   See printout for procedural vitals  Grounding pad applied to right leg, skin clean and intact     Awake/Alert

## 2023-08-10 ENCOUNTER — EMERGENCY (EMERGENCY)
Facility: HOSPITAL | Age: 78
LOS: 1 days | Discharge: HOME | End: 2023-08-10
Attending: STUDENT IN AN ORGANIZED HEALTH CARE EDUCATION/TRAINING PROGRAM
Payer: MEDICARE

## 2023-08-10 VITALS
RESPIRATION RATE: 21 BRPM | HEART RATE: 61 BPM | TEMPERATURE: 98 F | OXYGEN SATURATION: 99 % | DIASTOLIC BLOOD PRESSURE: 69 MMHG | SYSTOLIC BLOOD PRESSURE: 112 MMHG

## 2023-08-10 VITALS
DIASTOLIC BLOOD PRESSURE: 74 MMHG | OXYGEN SATURATION: 98 % | TEMPERATURE: 98 F | HEART RATE: 76 BPM | RESPIRATION RATE: 19 BRPM | SYSTOLIC BLOOD PRESSURE: 115 MMHG | WEIGHT: 167.99 LBS

## 2023-08-10 DIAGNOSIS — Z98.890 OTHER SPECIFIED POSTPROCEDURAL STATES: Chronic | ICD-10-CM

## 2023-08-10 LAB
ALBUMIN SERPL ELPH-MCNC: 3.4 G/DL — LOW (ref 3.5–5)
ALP SERPL-CCNC: 95 U/L — SIGNIFICANT CHANGE UP (ref 40–120)
ALT FLD-CCNC: 24 U/L DA — SIGNIFICANT CHANGE UP (ref 10–60)
ANION GAP SERPL CALC-SCNC: 4 MMOL/L — LOW (ref 5–17)
AST SERPL-CCNC: 20 U/L — SIGNIFICANT CHANGE UP (ref 10–40)
BASOPHILS # BLD AUTO: 0.03 K/UL — SIGNIFICANT CHANGE UP (ref 0–0.2)
BASOPHILS NFR BLD AUTO: 0.4 % — SIGNIFICANT CHANGE UP (ref 0–2)
BILIRUB SERPL-MCNC: 0.5 MG/DL — SIGNIFICANT CHANGE UP (ref 0.2–1.2)
BUN SERPL-MCNC: 18 MG/DL — SIGNIFICANT CHANGE UP (ref 7–18)
CALCIUM SERPL-MCNC: 9.4 MG/DL — SIGNIFICANT CHANGE UP (ref 8.4–10.5)
CHLORIDE SERPL-SCNC: 106 MMOL/L — SIGNIFICANT CHANGE UP (ref 96–108)
CO2 SERPL-SCNC: 26 MMOL/L — SIGNIFICANT CHANGE UP (ref 22–31)
CREAT SERPL-MCNC: 0.98 MG/DL — SIGNIFICANT CHANGE UP (ref 0.5–1.3)
EGFR: 60 ML/MIN/1.73M2 — SIGNIFICANT CHANGE UP
EOSINOPHIL # BLD AUTO: 0.13 K/UL — SIGNIFICANT CHANGE UP (ref 0–0.5)
EOSINOPHIL NFR BLD AUTO: 1.7 % — SIGNIFICANT CHANGE UP (ref 0–6)
GLUCOSE SERPL-MCNC: 93 MG/DL — SIGNIFICANT CHANGE UP (ref 70–99)
HCT VFR BLD CALC: 35.5 % — SIGNIFICANT CHANGE UP (ref 34.5–45)
HGB BLD-MCNC: 11.4 G/DL — LOW (ref 11.5–15.5)
IMM GRANULOCYTES NFR BLD AUTO: 0.4 % — SIGNIFICANT CHANGE UP (ref 0–0.9)
LYMPHOCYTES # BLD AUTO: 1.2 K/UL — SIGNIFICANT CHANGE UP (ref 1–3.3)
LYMPHOCYTES # BLD AUTO: 15.9 % — SIGNIFICANT CHANGE UP (ref 13–44)
MCHC RBC-ENTMCNC: 25.1 PG — LOW (ref 27–34)
MCHC RBC-ENTMCNC: 32.1 GM/DL — SIGNIFICANT CHANGE UP (ref 32–36)
MCV RBC AUTO: 78 FL — LOW (ref 80–100)
MONOCYTES # BLD AUTO: 0.53 K/UL — SIGNIFICANT CHANGE UP (ref 0–0.9)
MONOCYTES NFR BLD AUTO: 7 % — SIGNIFICANT CHANGE UP (ref 2–14)
NEUTROPHILS # BLD AUTO: 5.63 K/UL — SIGNIFICANT CHANGE UP (ref 1.8–7.4)
NEUTROPHILS NFR BLD AUTO: 74.6 % — SIGNIFICANT CHANGE UP (ref 43–77)
NRBC # BLD: 0 /100 WBCS — SIGNIFICANT CHANGE UP (ref 0–0)
PLATELET # BLD AUTO: 243 K/UL — SIGNIFICANT CHANGE UP (ref 150–400)
POTASSIUM SERPL-MCNC: 3.9 MMOL/L — SIGNIFICANT CHANGE UP (ref 3.5–5.3)
POTASSIUM SERPL-SCNC: 3.9 MMOL/L — SIGNIFICANT CHANGE UP (ref 3.5–5.3)
PROT SERPL-MCNC: 7 G/DL — SIGNIFICANT CHANGE UP (ref 6–8.3)
RAPID RVP RESULT: DETECTED
RBC # BLD: 4.55 M/UL — SIGNIFICANT CHANGE UP (ref 3.8–5.2)
RBC # FLD: 13.9 % — SIGNIFICANT CHANGE UP (ref 10.3–14.5)
RV+EV RNA SPEC QL NAA+PROBE: DETECTED
SARS-COV-2 RNA SPEC QL NAA+PROBE: SIGNIFICANT CHANGE UP
SODIUM SERPL-SCNC: 136 MMOL/L — SIGNIFICANT CHANGE UP (ref 135–145)
TROPONIN I, HIGH SENSITIVITY RESULT: 4 NG/L — SIGNIFICANT CHANGE UP
WBC # BLD: 7.55 K/UL — SIGNIFICANT CHANGE UP (ref 3.8–10.5)
WBC # FLD AUTO: 7.55 K/UL — SIGNIFICANT CHANGE UP (ref 3.8–10.5)

## 2023-08-10 PROCEDURE — 70498 CT ANGIOGRAPHY NECK: CPT | Mod: 26,MA

## 2023-08-10 PROCEDURE — 93005 ELECTROCARDIOGRAM TRACING: CPT

## 2023-08-10 PROCEDURE — 96375 TX/PRO/DX INJ NEW DRUG ADDON: CPT | Mod: XU

## 2023-08-10 PROCEDURE — 0225U NFCT DS DNA&RNA 21 SARSCOV2: CPT

## 2023-08-10 PROCEDURE — 85025 COMPLETE CBC W/AUTO DIFF WBC: CPT

## 2023-08-10 PROCEDURE — 71045 X-RAY EXAM CHEST 1 VIEW: CPT | Mod: 26

## 2023-08-10 PROCEDURE — 96361 HYDRATE IV INFUSION ADD-ON: CPT

## 2023-08-10 PROCEDURE — 82962 GLUCOSE BLOOD TEST: CPT

## 2023-08-10 PROCEDURE — 70498 CT ANGIOGRAPHY NECK: CPT | Mod: MA

## 2023-08-10 PROCEDURE — 93010 ELECTROCARDIOGRAM REPORT: CPT

## 2023-08-10 PROCEDURE — 99285 EMERGENCY DEPT VISIT HI MDM: CPT

## 2023-08-10 PROCEDURE — 84484 ASSAY OF TROPONIN QUANT: CPT

## 2023-08-10 PROCEDURE — 80053 COMPREHEN METABOLIC PANEL: CPT

## 2023-08-10 PROCEDURE — 96365 THER/PROPH/DIAG IV INF INIT: CPT | Mod: XU

## 2023-08-10 PROCEDURE — 36415 COLL VENOUS BLD VENIPUNCTURE: CPT

## 2023-08-10 PROCEDURE — 70496 CT ANGIOGRAPHY HEAD: CPT | Mod: MA

## 2023-08-10 PROCEDURE — 99285 EMERGENCY DEPT VISIT HI MDM: CPT | Mod: 25

## 2023-08-10 PROCEDURE — 70496 CT ANGIOGRAPHY HEAD: CPT | Mod: 26,MA

## 2023-08-10 PROCEDURE — 71045 X-RAY EXAM CHEST 1 VIEW: CPT

## 2023-08-10 RX ORDER — MECLIZINE HCL 12.5 MG
25 TABLET ORAL ONCE
Refills: 0 | Status: COMPLETED | OUTPATIENT
Start: 2023-08-10 | End: 2023-08-10

## 2023-08-10 RX ORDER — METOCLOPRAMIDE HCL 10 MG
10 TABLET ORAL ONCE
Refills: 0 | Status: COMPLETED | OUTPATIENT
Start: 2023-08-10 | End: 2023-08-10

## 2023-08-10 RX ORDER — SODIUM CHLORIDE 9 MG/ML
1000 INJECTION INTRAMUSCULAR; INTRAVENOUS; SUBCUTANEOUS ONCE
Refills: 0 | Status: COMPLETED | OUTPATIENT
Start: 2023-08-10 | End: 2023-08-10

## 2023-08-10 RX ORDER — ACETAMINOPHEN 500 MG
1000 TABLET ORAL ONCE
Refills: 0 | Status: COMPLETED | OUTPATIENT
Start: 2023-08-10 | End: 2023-08-10

## 2023-08-10 RX ADMIN — Medication 1000 MILLIGRAM(S): at 17:30

## 2023-08-10 RX ADMIN — SODIUM CHLORIDE 1000 MILLILITER(S): 9 INJECTION INTRAMUSCULAR; INTRAVENOUS; SUBCUTANEOUS at 16:13

## 2023-08-10 RX ADMIN — Medication 400 MILLIGRAM(S): at 15:51

## 2023-08-10 RX ADMIN — Medication 1000 MILLIGRAM(S): at 16:10

## 2023-08-10 RX ADMIN — Medication 25 MILLIGRAM(S): at 15:51

## 2023-08-10 RX ADMIN — SODIUM CHLORIDE 1000 MILLILITER(S): 9 INJECTION INTRAMUSCULAR; INTRAVENOUS; SUBCUTANEOUS at 17:13

## 2023-08-10 RX ADMIN — Medication 10 MILLIGRAM(S): at 15:51

## 2023-08-10 NOTE — ED PROVIDER NOTE - CLINICAL SUMMARY MEDICAL DECISION MAKING FREE TEXT BOX
Kings: 78 year old female with PMH HTN, HLD, anxiety, depression presents with intermittent dizziness x 2 weeks. Patient reports intermittent occipital headache and dizziness described as room spinning over the past 2 weeks, worse with head movements and when closing her eyes.  Patient also reports cough with productive green sputum over the past month, states she has seen multiple doctors and has taken multiple medications including Medrol Dosepak, azithromycin, benzonatate with little improvement.  Patient reports that over-the-counter Robitussin DM with improvement of cough.  Patient admits to rhinorrhea, but denies any fevers, vision change, nasal congestion, chest pain, shortness of breath, vomiting, diarrhea, dysuria, numbness, weakness, or rash.  Denies any recent injury or trauma.  Physical exam per above. No evidence of a cardiac arrythmia such as Brugada, WPW, HOCM, long or short QT.  Neurologic exam is nonfocal, not c/w CVA or primary neurologic abnormality. Will obtain labs r/o anemia, imaging r/o PNA, provide supportive treatment with dispo pending workup.

## 2023-08-10 NOTE — ED PROVIDER NOTE - IV ALTEPLASE DOOR HIDDEN
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 I recommend a trial of PO metronidazole 500mg BID x3m, the biggest potential side effects from this medication include temporary upset stomach in the first few days, a metallic taste in the mouth, urine discoloration, and sometimes exaggerated hangover-like reactions to EtOH if ingested. We could also treat w/aug beta dip 0.05% cream or ointment per preference 90g BID PRN 2w on, 1w off until smooth if okay w/continuing something topically. I would recommend re-eval in 3m if not significantly improved, thank you   show

## 2023-08-10 NOTE — ED ADULT NURSE NOTE - OBJECTIVE STATEMENT
Pt presents to ED complaining of dizziness and headache for 1 month. Denies blurry vision, shortness of breath , chest pain, N/V. Pt said she thought she would feel better but today she felt worse. Pt in NAD A&Ox3. Safety maintained.

## 2023-08-10 NOTE — ED PROVIDER NOTE - OBJECTIVE STATEMENT
78 year old female with PMH HTN, HLD, anxiety, depression presents with intermittent dizziness x 2 weeks. Patient reports intermittent occipital headache and dizziness described as room spinning over the past 2 weeks, worse with head movements and when closing her eyes.  Patient also reports cough with productive green sputum over the past month, states she has seen multiple doctors and has taken multiple medications including Medrol Dosepak, azithromycin, benzonatate with little improvement.  Patient reports that over-the-counter Robitussin DM with improvement of cough.  Patient admits to rhinorrhea, but denies any fevers, vision change, nasal congestion, chest pain, shortness of breath, vomiting, diarrhea, dysuria, numbness, weakness, or rash.  Denies any recent injury or trauma.  Denies any additional complaints.

## 2023-08-10 NOTE — ED ADULT NURSE NOTE - ED STAT RN HANDOFF DETAILS
Pt discharged from ED in stable condition- no acute distress. Discharge instructions discussed with pt, pt verbalized understanding. IV access removed prior to discharge.

## 2023-08-10 NOTE — ED PROVIDER NOTE - PHYSICAL EXAMINATION
CONSTITUTIONAL: non-toxic, well appearing  SKIN: no rash, no petechiae.  EYES: PERRL, EOMI, pink conjunctiva, anicteric  ENT: tongue and uvular midline, no exudates, moist mucous membranes  NECK: Supple; no meningismus, no JVD  CARD: RRR, no murmurs, equal radial pulses bilaterally 2+  RESP: CTAB, no respiratory distress  ABD: Soft, non-tender, non-distended, no peritoneal signs, no CVA tenderness  EXT: Normal ROM x4. No edema.   NEURO: Alert, oriented. Neuro exam nonfocal, equal strength bilaterally. CN II-XII intact.   PSYCH: Cooperative, appropriate.

## 2023-08-10 NOTE — ED PROVIDER NOTE - PATIENT PORTAL LINK FT
You can access the FollowMyHealth Patient Portal offered by [facility] by registering at the following website: [email or web address]. By joining [employer]’s FollowMyHealth portal, you will also be able to view your health information using other applications (apps) compatible with our system.

## 2023-08-10 NOTE — ED PROVIDER NOTE - NSFOLLOWUPINSTRUCTIONS_ED_ALL_ED_FT
Dizziness    Dizziness can manifest as a feeling of unsteadiness or light-headedness. You may feel like you are about to faint. This condition can be caused by a number of things, including medicines, dehydration, or illness. Drink enough fluid to keep your urine clear or pale yellow. Do not drink alcohol and limit your caffeine intake. Avoid quick or sudden movements.  Rise slowly from chairs and steady yourself until you feel okay. In the morning, first sit up on the side of the bed.    Continue taking meclizine up to 3 times a day as needed for dizziness.    SEEK IMMEDIATE MEDICAL CARE IF YOU HAVE ANY OF THE FOLLOWING SYMPTOMS: vomiting, changes in your vision or speech, weakness in your arms or legs, trouble speaking or swallowing, chest pain, abdominal pain, shortness of breath, sweating, bleeding, headache, neck pain, or fever.

## 2023-08-10 NOTE — ED PROVIDER NOTE - PROGRESS NOTE DETAILS
Kings: pt seen and re-evaluated at bedside.  Pt comfortable in NAD.  Labs/imaging non-actionable. Dizziness and cough resolved, pt requesting DC. Pt states she has meds at home for cough/dizziness, declines prescription. Will DC with PMD follow up/return precautions. Pt understood and agreeable with plan.

## 2023-08-10 NOTE — ED ADULT NURSE NOTE - NS_ED_NURSE_TEACHING_TOPIC_ED_A_ED
(THE BELOW MESSAGE IS BEING RELAYED BY THE RESULT MYCHART NOTE). Dear John Lozano,    I know you already saw these results. Your bone density still shows osteoporosis. If you would like to proceed with Prolia, let me know.     Dr. Lul Gold Cardiac

## 2023-08-31 ENCOUNTER — INPATIENT (INPATIENT)
Facility: HOSPITAL | Age: 78
LOS: 10 days | Discharge: HOME | DRG: 392 | End: 2023-09-11
Attending: HOSPITALIST | Admitting: STUDENT IN AN ORGANIZED HEALTH CARE EDUCATION/TRAINING PROGRAM
Payer: MEDICARE

## 2023-08-31 VITALS
TEMPERATURE: 98 F | OXYGEN SATURATION: 97 % | WEIGHT: 149.91 LBS | SYSTOLIC BLOOD PRESSURE: 120 MMHG | RESPIRATION RATE: 20 BRPM | DIASTOLIC BLOOD PRESSURE: 73 MMHG | HEART RATE: 82 BPM | HEIGHT: 63 IN

## 2023-08-31 DIAGNOSIS — Z98.890 OTHER SPECIFIED POSTPROCEDURAL STATES: Chronic | ICD-10-CM

## 2023-08-31 PROCEDURE — 99285 EMERGENCY DEPT VISIT HI MDM: CPT | Mod: GC

## 2023-09-01 DIAGNOSIS — E87.1 HYPO-OSMOLALITY AND HYPONATREMIA: ICD-10-CM

## 2023-09-01 DIAGNOSIS — R11.2 NAUSEA WITH VOMITING, UNSPECIFIED: ICD-10-CM

## 2023-09-01 DIAGNOSIS — K52.9 NONINFECTIVE GASTROENTERITIS AND COLITIS, UNSPECIFIED: ICD-10-CM

## 2023-09-01 DIAGNOSIS — Z29.9 ENCOUNTER FOR PROPHYLACTIC MEASURES, UNSPECIFIED: ICD-10-CM

## 2023-09-01 DIAGNOSIS — F41.9 ANXIETY DISORDER, UNSPECIFIED: ICD-10-CM

## 2023-09-01 DIAGNOSIS — D64.9 ANEMIA, UNSPECIFIED: ICD-10-CM

## 2023-09-01 DIAGNOSIS — R79.89 OTHER SPECIFIED ABNORMAL FINDINGS OF BLOOD CHEMISTRY: ICD-10-CM

## 2023-09-01 LAB
ALBUMIN SERPL ELPH-MCNC: 3.5 G/DL — SIGNIFICANT CHANGE UP (ref 3.3–5)
ALBUMIN SERPL ELPH-MCNC: 3.7 G/DL — SIGNIFICANT CHANGE UP (ref 3.3–5)
ALP SERPL-CCNC: 149 U/L — HIGH (ref 40–120)
ALP SERPL-CCNC: 167 U/L — HIGH (ref 40–120)
ALT FLD-CCNC: 50 U/L — HIGH (ref 10–45)
ALT FLD-CCNC: 59 U/L — HIGH (ref 10–45)
ANION GAP SERPL CALC-SCNC: 12 MMOL/L — SIGNIFICANT CHANGE UP (ref 5–17)
ANION GAP SERPL CALC-SCNC: 13 MMOL/L — SIGNIFICANT CHANGE UP (ref 5–17)
ANION GAP SERPL CALC-SCNC: 13 MMOL/L — SIGNIFICANT CHANGE UP (ref 5–17)
ANION GAP SERPL CALC-SCNC: 14 MMOL/L — SIGNIFICANT CHANGE UP (ref 5–17)
APPEARANCE UR: CLEAR — SIGNIFICANT CHANGE UP
AST SERPL-CCNC: 39 U/L — SIGNIFICANT CHANGE UP (ref 10–40)
AST SERPL-CCNC: 57 U/L — HIGH (ref 10–40)
BACTERIA # UR AUTO: NEGATIVE — SIGNIFICANT CHANGE UP
BASE EXCESS BLDV CALC-SCNC: 0.4 MMOL/L — SIGNIFICANT CHANGE UP (ref -2–3)
BASOPHILS # BLD AUTO: 0.02 K/UL — SIGNIFICANT CHANGE UP (ref 0–0.2)
BASOPHILS NFR BLD AUTO: 0.2 % — SIGNIFICANT CHANGE UP (ref 0–2)
BILIRUB SERPL-MCNC: 0.4 MG/DL — SIGNIFICANT CHANGE UP (ref 0.2–1.2)
BILIRUB SERPL-MCNC: 0.5 MG/DL — SIGNIFICANT CHANGE UP (ref 0.2–1.2)
BILIRUB UR-MCNC: NEGATIVE — SIGNIFICANT CHANGE UP
BUN SERPL-MCNC: 12 MG/DL — SIGNIFICANT CHANGE UP (ref 7–23)
BUN SERPL-MCNC: 13 MG/DL — SIGNIFICANT CHANGE UP (ref 7–23)
BUN SERPL-MCNC: 15 MG/DL — SIGNIFICANT CHANGE UP (ref 7–23)
BUN SERPL-MCNC: 23 MG/DL — SIGNIFICANT CHANGE UP (ref 7–23)
CA-I SERPL-SCNC: 1.26 MMOL/L — SIGNIFICANT CHANGE UP (ref 1.15–1.33)
CALCIUM SERPL-MCNC: 9.2 MG/DL — SIGNIFICANT CHANGE UP (ref 8.4–10.5)
CALCIUM SERPL-MCNC: 9.3 MG/DL — SIGNIFICANT CHANGE UP (ref 8.4–10.5)
CALCIUM SERPL-MCNC: 9.5 MG/DL — SIGNIFICANT CHANGE UP (ref 8.4–10.5)
CALCIUM SERPL-MCNC: 9.8 MG/DL — SIGNIFICANT CHANGE UP (ref 8.4–10.5)
CHLORIDE BLDV-SCNC: 94 MMOL/L — LOW (ref 96–108)
CHLORIDE SERPL-SCNC: 101 MMOL/L — SIGNIFICANT CHANGE UP (ref 96–108)
CHLORIDE SERPL-SCNC: 101 MMOL/L — SIGNIFICANT CHANGE UP (ref 96–108)
CHLORIDE SERPL-SCNC: 102 MMOL/L — SIGNIFICANT CHANGE UP (ref 96–108)
CHLORIDE SERPL-SCNC: 92 MMOL/L — LOW (ref 96–108)
CHLORIDE UR-SCNC: <20 MMOL/L — SIGNIFICANT CHANGE UP
CO2 BLDV-SCNC: 27 MMOL/L — HIGH (ref 22–26)
CO2 SERPL-SCNC: 20 MMOL/L — LOW (ref 22–31)
CO2 SERPL-SCNC: 22 MMOL/L — SIGNIFICANT CHANGE UP (ref 22–31)
CO2 SERPL-SCNC: 22 MMOL/L — SIGNIFICANT CHANGE UP (ref 22–31)
CO2 SERPL-SCNC: 24 MMOL/L — SIGNIFICANT CHANGE UP (ref 22–31)
COLOR SPEC: SIGNIFICANT CHANGE UP
CREAT ?TM UR-MCNC: 32 MG/DL — SIGNIFICANT CHANGE UP
CREAT SERPL-MCNC: 0.77 MG/DL — SIGNIFICANT CHANGE UP (ref 0.5–1.3)
CREAT SERPL-MCNC: 0.82 MG/DL — SIGNIFICANT CHANGE UP (ref 0.5–1.3)
CREAT SERPL-MCNC: 0.85 MG/DL — SIGNIFICANT CHANGE UP (ref 0.5–1.3)
CREAT SERPL-MCNC: 0.95 MG/DL — SIGNIFICANT CHANGE UP (ref 0.5–1.3)
DIFF PNL FLD: NEGATIVE — SIGNIFICANT CHANGE UP
EGFR: 62 ML/MIN/1.73M2 — SIGNIFICANT CHANGE UP
EGFR: 71 ML/MIN/1.73M2 — SIGNIFICANT CHANGE UP
EGFR: 74 ML/MIN/1.73M2 — SIGNIFICANT CHANGE UP
EGFR: 80 ML/MIN/1.73M2 — SIGNIFICANT CHANGE UP
EOSINOPHIL # BLD AUTO: 0.34 K/UL — SIGNIFICANT CHANGE UP (ref 0–0.5)
EOSINOPHIL NFR BLD AUTO: 3.2 % — SIGNIFICANT CHANGE UP (ref 0–6)
EPI CELLS # UR: 1 /HPF — SIGNIFICANT CHANGE UP
FERRITIN SERPL-MCNC: 1722 NG/ML — HIGH (ref 13–330)
FLUAV AG NPH QL: SIGNIFICANT CHANGE UP
FLUBV AG NPH QL: SIGNIFICANT CHANGE UP
FOLATE SERPL-MCNC: >20 NG/ML — SIGNIFICANT CHANGE UP
GAS PNL BLDV: 126 MMOL/L — LOW (ref 136–145)
GAS PNL BLDV: SIGNIFICANT CHANGE UP
GAS PNL BLDV: SIGNIFICANT CHANGE UP
GLUCOSE BLDV-MCNC: 123 MG/DL — HIGH (ref 70–99)
GLUCOSE SERPL-MCNC: 105 MG/DL — HIGH (ref 70–99)
GLUCOSE SERPL-MCNC: 106 MG/DL — HIGH (ref 70–99)
GLUCOSE SERPL-MCNC: 109 MG/DL — HIGH (ref 70–99)
GLUCOSE SERPL-MCNC: 123 MG/DL — HIGH (ref 70–99)
GLUCOSE UR QL: NEGATIVE — SIGNIFICANT CHANGE UP
HCO3 BLDV-SCNC: 26 MMOL/L — SIGNIFICANT CHANGE UP (ref 22–29)
HCT VFR BLD CALC: 30.8 % — LOW (ref 34.5–45)
HCT VFR BLD CALC: 31.4 % — LOW (ref 34.5–45)
HCT VFR BLDA CALC: 32 % — LOW (ref 34.5–46.5)
HGB BLD CALC-MCNC: 10.6 G/DL — LOW (ref 11.7–16.1)
HGB BLD-MCNC: 10.1 G/DL — LOW (ref 11.5–15.5)
HGB BLD-MCNC: 9.8 G/DL — LOW (ref 11.5–15.5)
IMM GRANULOCYTES NFR BLD AUTO: 0.7 % — SIGNIFICANT CHANGE UP (ref 0–0.9)
IRON SATN MFR SERPL: 16 UG/DL — LOW (ref 30–160)
IRON SATN MFR SERPL: 9 % — LOW (ref 14–50)
KETONES UR-MCNC: NEGATIVE — SIGNIFICANT CHANGE UP
LACTATE BLDV-MCNC: 0.9 MMOL/L — SIGNIFICANT CHANGE UP (ref 0.5–2)
LEUKOCYTE ESTERASE UR-ACNC: NEGATIVE — SIGNIFICANT CHANGE UP
LIDOCAIN IGE QN: 24 U/L — SIGNIFICANT CHANGE UP (ref 7–60)
LYMPHOCYTES # BLD AUTO: 1.08 K/UL — SIGNIFICANT CHANGE UP (ref 1–3.3)
LYMPHOCYTES # BLD AUTO: 10.2 % — LOW (ref 13–44)
MAGNESIUM SERPL-MCNC: 1.8 MG/DL — SIGNIFICANT CHANGE UP (ref 1.6–2.6)
MAGNESIUM SERPL-MCNC: 1.9 MG/DL — SIGNIFICANT CHANGE UP (ref 1.6–2.6)
MCHC RBC-ENTMCNC: 24.3 PG — LOW (ref 27–34)
MCHC RBC-ENTMCNC: 24.6 PG — LOW (ref 27–34)
MCHC RBC-ENTMCNC: 31.8 GM/DL — LOW (ref 32–36)
MCHC RBC-ENTMCNC: 32.2 GM/DL — SIGNIFICANT CHANGE UP (ref 32–36)
MCV RBC AUTO: 76.4 FL — LOW (ref 80–100)
MCV RBC AUTO: 76.4 FL — LOW (ref 80–100)
MONOCYTES # BLD AUTO: 0.94 K/UL — HIGH (ref 0–0.9)
MONOCYTES NFR BLD AUTO: 8.9 % — SIGNIFICANT CHANGE UP (ref 2–14)
NEUTROPHILS # BLD AUTO: 8.13 K/UL — HIGH (ref 1.8–7.4)
NEUTROPHILS NFR BLD AUTO: 76.8 % — SIGNIFICANT CHANGE UP (ref 43–77)
NITRITE UR-MCNC: NEGATIVE — SIGNIFICANT CHANGE UP
NRBC # BLD: 0 /100 WBCS — SIGNIFICANT CHANGE UP (ref 0–0)
NRBC # BLD: 0 /100 WBCS — SIGNIFICANT CHANGE UP (ref 0–0)
NT-PROBNP SERPL-SCNC: 534 PG/ML — HIGH (ref 0–300)
OSMOLALITY SERPL: 284 MOSMOL/KG — SIGNIFICANT CHANGE UP (ref 280–301)
OSMOLALITY UR: 176 MOS/KG — LOW (ref 300–900)
PCO2 BLDV: 45 MMHG — HIGH (ref 39–42)
PH BLDV: 7.37 — SIGNIFICANT CHANGE UP (ref 7.32–7.43)
PH UR: 6 — SIGNIFICANT CHANGE UP (ref 5–8)
PHOSPHATE SERPL-MCNC: 2.6 MG/DL — SIGNIFICANT CHANGE UP (ref 2.5–4.5)
PHOSPHATE SERPL-MCNC: 2.7 MG/DL — SIGNIFICANT CHANGE UP (ref 2.5–4.5)
PHOSPHATE SERPL-MCNC: 2.9 MG/DL — SIGNIFICANT CHANGE UP (ref 2.5–4.5)
PLATELET # BLD AUTO: 228 K/UL — SIGNIFICANT CHANGE UP (ref 150–400)
PLATELET # BLD AUTO: 259 K/UL — SIGNIFICANT CHANGE UP (ref 150–400)
PO2 BLDV: 23 MMHG — LOW (ref 25–45)
POTASSIUM BLDV-SCNC: 3.7 MMOL/L — SIGNIFICANT CHANGE UP (ref 3.5–5.1)
POTASSIUM SERPL-MCNC: 3.7 MMOL/L — SIGNIFICANT CHANGE UP (ref 3.5–5.3)
POTASSIUM SERPL-MCNC: 3.8 MMOL/L — SIGNIFICANT CHANGE UP (ref 3.5–5.3)
POTASSIUM SERPL-MCNC: 3.8 MMOL/L — SIGNIFICANT CHANGE UP (ref 3.5–5.3)
POTASSIUM SERPL-MCNC: 3.9 MMOL/L — SIGNIFICANT CHANGE UP (ref 3.5–5.3)
POTASSIUM SERPL-SCNC: 3.7 MMOL/L — SIGNIFICANT CHANGE UP (ref 3.5–5.3)
POTASSIUM SERPL-SCNC: 3.8 MMOL/L — SIGNIFICANT CHANGE UP (ref 3.5–5.3)
POTASSIUM SERPL-SCNC: 3.8 MMOL/L — SIGNIFICANT CHANGE UP (ref 3.5–5.3)
POTASSIUM SERPL-SCNC: 3.9 MMOL/L — SIGNIFICANT CHANGE UP (ref 3.5–5.3)
PROT SERPL-MCNC: 7.1 G/DL — SIGNIFICANT CHANGE UP (ref 6–8.3)
PROT SERPL-MCNC: 7.5 G/DL — SIGNIFICANT CHANGE UP (ref 6–8.3)
PROT UR-MCNC: NEGATIVE — SIGNIFICANT CHANGE UP
RAPID RVP RESULT: SIGNIFICANT CHANGE UP
RBC # BLD: 4.03 M/UL — SIGNIFICANT CHANGE UP (ref 3.8–5.2)
RBC # BLD: 4.11 M/UL — SIGNIFICANT CHANGE UP (ref 3.8–5.2)
RBC # FLD: 13.8 % — SIGNIFICANT CHANGE UP (ref 10.3–14.5)
RBC # FLD: 14 % — SIGNIFICANT CHANGE UP (ref 10.3–14.5)
RBC CASTS # UR COMP ASSIST: 2 /HPF — SIGNIFICANT CHANGE UP (ref 0–4)
RSV RNA NPH QL NAA+NON-PROBE: SIGNIFICANT CHANGE UP
SAO2 % BLDV: 27.4 % — LOW (ref 67–88)
SARS-COV-2 RNA SPEC QL NAA+PROBE: SIGNIFICANT CHANGE UP
SARS-COV-2 RNA SPEC QL NAA+PROBE: SIGNIFICANT CHANGE UP
SODIUM SERPL-SCNC: 128 MMOL/L — LOW (ref 135–145)
SODIUM SERPL-SCNC: 134 MMOL/L — LOW (ref 135–145)
SODIUM SERPL-SCNC: 137 MMOL/L — SIGNIFICANT CHANGE UP (ref 135–145)
SODIUM SERPL-SCNC: 137 MMOL/L — SIGNIFICANT CHANGE UP (ref 135–145)
SODIUM UR-SCNC: 6 MMOL/L — SIGNIFICANT CHANGE UP
SP GR SPEC: 1.01 — LOW (ref 1.01–1.02)
TIBC SERPL-MCNC: 171 UG/DL — LOW (ref 220–430)
TROPONIN T, HIGH SENSITIVITY RESULT: 9 NG/L — SIGNIFICANT CHANGE UP (ref 0–51)
UIBC SERPL-MCNC: 156 UG/DL — SIGNIFICANT CHANGE UP (ref 110–370)
UROBILINOGEN FLD QL: NEGATIVE — SIGNIFICANT CHANGE UP
UUN UR-MCNC: 358 MG/DL — SIGNIFICANT CHANGE UP
VIT B12 SERPL-MCNC: 839 PG/ML — SIGNIFICANT CHANGE UP (ref 232–1245)
WBC # BLD: 10.58 K/UL — HIGH (ref 3.8–10.5)
WBC # BLD: 8.03 K/UL — SIGNIFICANT CHANGE UP (ref 3.8–10.5)
WBC # FLD AUTO: 10.58 K/UL — HIGH (ref 3.8–10.5)
WBC # FLD AUTO: 8.03 K/UL — SIGNIFICANT CHANGE UP (ref 3.8–10.5)
WBC UR QL: 2 /HPF — SIGNIFICANT CHANGE UP (ref 0–5)

## 2023-09-01 PROCEDURE — 99223 1ST HOSP IP/OBS HIGH 75: CPT

## 2023-09-01 PROCEDURE — 74177 CT ABD & PELVIS W/CONTRAST: CPT | Mod: 26

## 2023-09-01 PROCEDURE — 76705 ECHO EXAM OF ABDOMEN: CPT | Mod: 26

## 2023-09-01 PROCEDURE — 71045 X-RAY EXAM CHEST 1 VIEW: CPT | Mod: 26

## 2023-09-01 PROCEDURE — 99222 1ST HOSP IP/OBS MODERATE 55: CPT

## 2023-09-01 PROCEDURE — 71260 CT THORAX DX C+: CPT | Mod: 26,MA

## 2023-09-01 RX ORDER — ATORVASTATIN CALCIUM 80 MG/1
40 TABLET, FILM COATED ORAL AT BEDTIME
Refills: 0 | Status: DISCONTINUED | OUTPATIENT
Start: 2023-09-01 | End: 2023-09-11

## 2023-09-01 RX ORDER — SUCRALFATE 1 G
1 TABLET ORAL
Refills: 0 | DISCHARGE

## 2023-09-01 RX ORDER — MEMANTINE HYDROCHLORIDE AND DONEPEZIL HYDROCHLORIDE 21; 10 MG/1; MG/1
1 CAPSULE ORAL
Refills: 0 | DISCHARGE

## 2023-09-01 RX ORDER — AMITRIPTYLINE HCL 25 MG
10 TABLET ORAL DAILY
Refills: 0 | Status: DISCONTINUED | OUTPATIENT
Start: 2023-09-01 | End: 2023-09-11

## 2023-09-01 RX ORDER — LOSARTAN POTASSIUM 100 MG/1
50 TABLET, FILM COATED ORAL DAILY
Refills: 0 | Status: DISCONTINUED | OUTPATIENT
Start: 2023-09-01 | End: 2023-09-11

## 2023-09-01 RX ORDER — SODIUM CHLORIDE 9 MG/ML
1000 INJECTION INTRAMUSCULAR; INTRAVENOUS; SUBCUTANEOUS
Refills: 0 | Status: DISCONTINUED | OUTPATIENT
Start: 2023-09-01 | End: 2023-09-01

## 2023-09-01 RX ORDER — LINACLOTIDE 145 UG/1
145 CAPSULE, GELATIN COATED ORAL
Refills: 0 | Status: DISCONTINUED | OUTPATIENT
Start: 2023-09-01 | End: 2023-09-02

## 2023-09-01 RX ORDER — MAGNESIUM OXIDE 400 MG ORAL TABLET 241.3 MG
1 TABLET ORAL
Refills: 0 | DISCHARGE

## 2023-09-01 RX ORDER — DEXLANSOPRAZOLE 30 MG/1
1 CAPSULE, DELAYED RELEASE ORAL
Refills: 0 | DISCHARGE

## 2023-09-01 RX ORDER — PANTOPRAZOLE SODIUM 20 MG/1
40 TABLET, DELAYED RELEASE ORAL
Refills: 0 | Status: DISCONTINUED | OUTPATIENT
Start: 2023-09-01 | End: 2023-09-11

## 2023-09-01 RX ORDER — VORTIOXETINE 5 MG/1
1 TABLET, FILM COATED ORAL
Refills: 0 | DISCHARGE

## 2023-09-01 RX ORDER — OLMESARTAN MEDOXOMIL 5 MG/1
1 TABLET, FILM COATED ORAL
Refills: 0 | DISCHARGE

## 2023-09-01 RX ORDER — ATORVASTATIN CALCIUM 80 MG/1
1 TABLET, FILM COATED ORAL
Refills: 0 | DISCHARGE

## 2023-09-01 RX ORDER — SODIUM CHLORIDE 9 MG/ML
250 INJECTION, SOLUTION INTRAVENOUS
Refills: 0 | Status: COMPLETED | OUTPATIENT
Start: 2023-09-01 | End: 2023-09-01

## 2023-09-01 RX ORDER — PANTOPRAZOLE SODIUM 20 MG/1
80 TABLET, DELAYED RELEASE ORAL ONCE
Refills: 0 | Status: COMPLETED | OUTPATIENT
Start: 2023-09-01 | End: 2023-09-01

## 2023-09-01 RX ORDER — METRONIDAZOLE 500 MG
500 TABLET ORAL EVERY 8 HOURS
Refills: 0 | Status: DISCONTINUED | OUTPATIENT
Start: 2023-09-01 | End: 2023-09-07

## 2023-09-01 RX ORDER — BREXPIPRAZOLE 0.25 MG/1
1 TABLET ORAL
Refills: 0 | DISCHARGE

## 2023-09-01 RX ORDER — AMITRIPTYLINE HCL 25 MG
1 TABLET ORAL
Refills: 0 | DISCHARGE

## 2023-09-01 RX ORDER — ERGOCALCIFEROL 1.25 MG/1
1 CAPSULE ORAL
Refills: 0 | DISCHARGE

## 2023-09-01 RX ORDER — ONDANSETRON 8 MG/1
4 TABLET, FILM COATED ORAL ONCE
Refills: 0 | Status: COMPLETED | OUTPATIENT
Start: 2023-09-01 | End: 2023-09-01

## 2023-09-01 RX ORDER — METRONIDAZOLE 500 MG
TABLET ORAL
Refills: 0 | Status: DISCONTINUED | OUTPATIENT
Start: 2023-09-01 | End: 2023-09-07

## 2023-09-01 RX ORDER — FAMOTIDINE 10 MG/ML
1 INJECTION INTRAVENOUS
Refills: 0 | DISCHARGE

## 2023-09-01 RX ORDER — LINACLOTIDE 145 UG/1
1 CAPSULE, GELATIN COATED ORAL
Refills: 0 | DISCHARGE

## 2023-09-01 RX ORDER — ONDANSETRON 8 MG/1
1 TABLET, FILM COATED ORAL
Refills: 0 | DISCHARGE

## 2023-09-01 RX ORDER — MULTIVIT-MIN/FERROUS GLUCONATE 9 MG/15 ML
1 LIQUID (ML) ORAL
Refills: 0 | DISCHARGE

## 2023-09-01 RX ORDER — ENOXAPARIN SODIUM 100 MG/ML
40 INJECTION SUBCUTANEOUS EVERY 24 HOURS
Refills: 0 | Status: DISCONTINUED | OUTPATIENT
Start: 2023-09-01 | End: 2023-09-11

## 2023-09-01 RX ORDER — SODIUM CHLORIDE 9 MG/ML
1000 INJECTION, SOLUTION INTRAVENOUS
Refills: 0 | Status: DISCONTINUED | OUTPATIENT
Start: 2023-09-01 | End: 2023-09-02

## 2023-09-01 RX ORDER — SODIUM CHLORIDE 9 MG/ML
500 INJECTION INTRAMUSCULAR; INTRAVENOUS; SUBCUTANEOUS ONCE
Refills: 0 | Status: COMPLETED | OUTPATIENT
Start: 2023-09-01 | End: 2023-09-01

## 2023-09-01 RX ORDER — CEFTRIAXONE 500 MG/1
1000 INJECTION, POWDER, FOR SOLUTION INTRAMUSCULAR; INTRAVENOUS EVERY 24 HOURS
Refills: 0 | Status: DISCONTINUED | OUTPATIENT
Start: 2023-09-01 | End: 2023-09-07

## 2023-09-01 RX ORDER — METRONIDAZOLE 500 MG
500 TABLET ORAL ONCE
Refills: 0 | Status: COMPLETED | OUTPATIENT
Start: 2023-09-01 | End: 2023-09-01

## 2023-09-01 RX ADMIN — Medication 100 MILLIGRAM(S): at 14:17

## 2023-09-01 RX ADMIN — SODIUM CHLORIDE 500 MILLILITER(S): 9 INJECTION INTRAMUSCULAR; INTRAVENOUS; SUBCUTANEOUS at 01:40

## 2023-09-01 RX ADMIN — SODIUM CHLORIDE 50 MILLILITER(S): 9 INJECTION, SOLUTION INTRAVENOUS at 19:40

## 2023-09-01 RX ADMIN — Medication 10 MILLIGRAM(S): at 16:11

## 2023-09-01 RX ADMIN — CEFTRIAXONE 100 MILLIGRAM(S): 500 INJECTION, POWDER, FOR SOLUTION INTRAMUSCULAR; INTRAVENOUS at 13:14

## 2023-09-01 RX ADMIN — ATORVASTATIN CALCIUM 40 MILLIGRAM(S): 80 TABLET, FILM COATED ORAL at 22:04

## 2023-09-01 RX ADMIN — Medication 100 MILLIGRAM(S): at 22:04

## 2023-09-01 RX ADMIN — PANTOPRAZOLE SODIUM 80 MILLIGRAM(S): 20 TABLET, DELAYED RELEASE ORAL at 01:40

## 2023-09-01 RX ADMIN — ONDANSETRON 4 MILLIGRAM(S): 8 TABLET, FILM COATED ORAL at 01:40

## 2023-09-01 RX ADMIN — SODIUM CHLORIDE 125 MILLILITER(S): 9 INJECTION INTRAMUSCULAR; INTRAVENOUS; SUBCUTANEOUS at 06:40

## 2023-09-01 RX ADMIN — SODIUM CHLORIDE 125 MILLILITER(S): 9 INJECTION, SOLUTION INTRAVENOUS at 15:51

## 2023-09-01 NOTE — H&P ADULT - PROBLEM SELECTOR PLAN 6
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

## 2023-09-01 NOTE — H&P ADULT - NSHPADDITIONALINFOADULT_GEN_ALL_CORE
d/w acp    Severo Lopez MD  [facility] Division of Hospital Medicine  Available via MS Teams  Pager: [phone]

## 2023-09-01 NOTE — CONSULT NOTE ADULT - SUBJECTIVE AND OBJECTIVE BOX
Chief Complaint:  Patient is a 78y old  Female who presents with a chief complaint of nausea, vomiting (01 Sep 2023 10:07)      HPI: Patient is a 79 yo female w/ hx of HTN, HLD, Depression, Anxiety, ?Dementia who presents with nausea vomiting for the past 2 days. States that she has been having few episodes of nbnb vomiting for the past 2 days and felt chills but no true fever. Patient states she is unable to tolerate any solid foods in over 2 days because it causes nausea. Patient states that this has been ongoing for 2-3 weeks, but for the last 5 days it has been getting more severe, and in the last 2 days she could not tolerate any PO intake. Patient states that when she vomits, she sees undigested particles of food she previously attempted to eat. Patient also states that in the past 5 days she had 2 episodes of non-bloody diarrhea. States she also has been having decreased appetite for 4-5 months, and has lost 10lbs since June. States she also has been having difficulty swallowing solid foods and ocassionally feels acid reflux. Around 5 months ago, she has an episode of seeing bright red blood on the toilet paper when wiping. She went to [facility] 2 weeks ago for dizziness and was discharged with meclizine. About one month ago she had green sputum production and was discharged with medrol dose pack and azithromycin which she finished. Also endorses mild headache on her forehead area. Denies any abdominal pain, constipation (has been having regular bms), fatigue, weakness, diarrhea, pain with swallowing.     Allergies:  No Known Allergies      Home Medications:    Hospital Medications:  amitriptyline 10 milliGRAM(s) Oral daily  atorvastatin 40 milliGRAM(s) Oral at bedtime  linaclotide 145 MICROGram(s) Oral before breakfast  losartan 50 milliGRAM(s) Oral daily  Namzaric(Memantine &amp; Donepezil)28/10mg 1 Capsule(s)   Oral daily  pantoprazole    Tablet 40 milliGRAM(s) Oral before breakfast      PMHX/PSHX:  HLD (hyperlipidemia)    Anxiety    Depression    H/O prior ablation treatment        Family history:  No pertinent family history in first degree relatives        Social History: Patient denies any tobacco use, alcohol use, or illicit substance use.     ROS:     General:  +wt loss, no fevers, + chills, no night sweats, + fatigue, + headache    Eyes:  Good vision, no reported pain  ENT:  No sore throat, pain, runny nose, dysphagia  CV:  No pain, palpitations, hypo/hypertension  Resp:  No dyspnea, + non-productive cough, no tachypnea, no wheezing  GI:  No pain, No nausea, + non-bloody non-bilious vomiting, +diarrhea, No constipation, + weight loss, No tarry stools, No dysphagia,  :  No pain, bleeding, incontinence, nocturia  Muscle:  No pain, weakness  Neuro:  No weakness, tingling, memory problems  Psych:  No fatigue, insomnia, mood problems, depression  Endocrine:  No polyuria, polydipsia, cold/heat intolerance  Heme:  No petechiae, ecchymosis, easy bruisability  Skin:  No rash, tattoos, scars, edema      PHYSICAL EXAM:     GENERAL:  Appears stated age, well-groomed, well-nourished, no distress  HEENT:  NC/AT,  conjunctivae clear and pink, no thyromegaly, nodules, adenopathy, no JVD, sclera -anicteric  CHEST:  Full & symmetric excursion, no increased effort, breath sounds clear  HEART:  Regular rhythm, S1, S2, no murmur/rub/S3/S4, no abdominal bruit, no edema  ABDOMEN:  Soft, non-tender, non-distended, normoactive bowel sounds,  no masses ,no hepato-splenomegaly, no signs of chronic liver disease  EXTEREMITIES:  no cyanosis,clubbing or edema  SKIN:  No rash/erythema/ecchymoses/petechiae/wounds/abscess/warm/dry  NEURO:  Alert, oriented, no asterixis, no tremor, no encephalopathy    Vital Signs:  Vital Signs Last 24 Hrs  T(C): 36.7 (01 Sep 2023 09:03), Max: 36.7 (31 Aug 2023 21:56)  T(F): 98 (01 Sep 2023 09:03), Max: 98.1 (31 Aug 2023 21:56)  HR: 75 (01 Sep 2023 09:03) (70 - 82)  BP: 110/72 (01 Sep 2023 09:03) (103/59 - 120/73)  BP(mean): 72 (01 Sep 2023 05:14) (72 - 72)  RR: 18 (01 Sep 2023 09:03) (18 - 20)  SpO2: 98% (01 Sep 2023 09:03) (97% - 98%)    Parameters below as of 01 Sep 2023 09:03  Patient On (Oxygen Delivery Method): room air      Daily Height in cm: 160.02 (31 Aug 2023 21:56)    Daily     LABS:                        9.8    8.03  )-----------( 228      ( 01 Sep 2023 10:33 )             30.8     Mean Cell Volume: 76.4 fl (09-01-23 @ 10:33)    09-01    137  |  101  |  15  ----------------------------<  105<H>  3.8   |  24  |  0.85    Ca    9.5      01 Sep 2023 10:33  Phos  2.9     09-01  Mg     1.9     09-01    TPro  7.1  /  Alb  3.5  /  TBili  0.4  /  DBili  x   /  AST  39  /  ALT  50<H>  /  AlkPhos  149<H>  09-01    LIVER FUNCTIONS - ( 01 Sep 2023 10:33 )  Alb: 3.5 g/dL / Pro: 7.1 g/dL / ALK PHOS: 149 U/L / ALT: 50 U/L / AST: 39 U/L / GGT: x             Urinalysis Basic - ( 01 Sep 2023 10:33 )    Color: x / Appearance: x / SG: x / pH: x  Gluc: 105 mg/dL / Ketone: x  / Bili: x / Urobili: x   Blood: x / Protein: x / Nitrite: x   Leuk Esterase: x / RBC: x / WBC x   Sq Epi: x / Non Sq Epi: x / Bacteria: x      Amylase Serum--      Lipase serum24       Ammonia--                          9.8    8.03  )-----------( 228      ( 01 Sep 2023 10:33 )             30.8                         10.1   10.58 )-----------( 259      ( 01 Sep 2023 01:31 )             31.4     Imaging:    PROCEDURE:  CT of the Chest was performed.  Sagittal and coronal reformats were performed.    FINDINGS:    LUNGS AND AIRWAYS: Patent central airways. Trace atelectasis in the right lower lobe. Approximately 2 mm right lower lobe pulmonary nodule (2:62 and 3:248-49) is partially obscured by motion artifact; this appears to been present on 03/21/2021.  PLEURA: No pleural effusion.  MEDIASTINUM AND ILIANA: No lymphadenopathy.  VESSELS: No thoracic aortic aneurysm or gross dissection. Mild atherosclerotic calcification of the subclavian arteries..  HEART: Heart size is normal. Qualitatively there is mild to moderate atherosclerotic calcification of the coronary arteries. No pericardial effusion.  CHEST WALL AND LOWER NECK: Within normal limits.  VISUALIZED UPPER ABDOMEN: There is wall thickening versus underdistention in the partially visualized distal transverse colon and proximal descending colon. Approximately 1.2 cm right renal cyst (2:120).  BONES: Anterior vertebral marginal osteophytes in the thoracic spine. Hemangiomas in the T11 vertebral body.    IMPRESSION:  No CT evidence of pneumonia or pulmonary edema. No intrathoracic mass is visualized by CT technique.    Wall thickening versus underdistention in the partially visualized distal transverse colon and proximal descending colon. Colitis should be excluded clinically.    An incidental 2 mm right lower lobe pulmonary nodule is partially obscured by motion artifact; this appears to be present on 03/21/2021.             Chief Complaint:  Patient is a 78y old  Female who presents with a chief complaint of nausea, vomiting (01 Sep 2023 10:07)      HPI: Patient is a 77 yo female w/ hx of HTN, HLD, Depression, Anxiety, ?Dementia who presents with nausea vomiting for the past 2 days. States that she has been having few episodes of nbnb vomiting for the past 2 days and felt chills but no true fever. Patient states she is unable to tolerate any solid foods in over 2 days because it causes nausea. Patient states that this has been ongoing for 2-3 weeks, but for the last 5 days it has been getting more severe, and in the last 2 days she could not tolerate any PO intake. Patient states that when she vomits, she sees undigested particles of food she previously attempted to eat. Patient also states that in the past 5 days she had 2 episodes of non-bloody diarrhea. States she also has been having decreased appetite for 4-5 months, and has lost 10lbs since June. States she also has been having difficulty swallowing solid foods and ocassionally feels acid reflux. Around 5 months ago, she has an episode of seeing bright red blood on the toilet paper when wiping. She went to [facility] 2 weeks ago for dizziness and was discharged with meclizine. About one month ago she had green sputum production and was discharged with medrol dose pack and azithromycin which she finished. Also endorses mild headache on her forehead area. Denies any abdominal pain, constipation (has been having regular bms), fatigue, weakness, diarrhea, pain with swallowing.     Allergies:  No Known Allergies      Home Medications:    Hospital Medications:  amitriptyline 10 milliGRAM(s) Oral daily  atorvastatin 40 milliGRAM(s) Oral at bedtime  linaclotide 145 MICROGram(s) Oral before breakfast  losartan 50 milliGRAM(s) Oral daily  Namzaric(Memantine &amp; Donepezil)28/10mg 1 Capsule(s)   Oral daily  pantoprazole    Tablet 40 milliGRAM(s) Oral before breakfast      PMHX/PSHX:  HLD (hyperlipidemia)    Anxiety    Depression    H/O prior ablation treatment        Family history:  No pertinent family history in first degree relatives        Social History: Patient denies any tobacco use, alcohol use, or illicit substance use.     ROS:     General:  +wt loss, no fevers, + chills, no night sweats, + fatigue, + headache    Eyes:  Good vision, no reported pain  ENT:  No sore throat, pain, runny nose, dysphagia  CV:  No pain, palpitations, hypo/hypertension  Resp:  No dyspnea, + non-productive cough, no tachypnea, no wheezing  GI:  No pain, No nausea, + non-bloody non-bilious vomiting, +diarrhea, No constipation, + weight loss, No tarry stools, No dysphagia,  :  No pain, bleeding, incontinence, nocturia  Muscle:  No pain, weakness  Neuro:  No weakness, tingling, memory problems  Psych:  No fatigue, insomnia, mood problems, depression  Endocrine:  No polyuria, polydipsia, cold/heat intolerance  Heme:  No petechiae, ecchymosis, easy bruisability  Skin:  No rash, tattoos, scars, edema      PHYSICAL EXAM:     General: NAD, well groomed, well developed  Head: atraumatic, normocephalic   Eyes: pupils equal, round, reactive to light, anicteric sclera  ENMT: slightly dry mucous membranes   Neck: supple, no JVD, no tender lymphadenopathy   Lungs: clear lung fields bilaterally, no wheezes, rales, or rhonchi, no accessory muscle use for respirations   Heart: regular rate and rhythm, normal s1,s2, no murmurs, rubs, or gallops   Abdomen: soft, non-distended, moderately tender in the umbilical region, no rebound tenderness, no involuntary guarding, normoactive bowel sounds  Extremities: warm, well perfused, no lower extremity edema bilaterally   Neuro: AOX3, no focal neurological deficits     Vital Signs:  Vital Signs Last 24 Hrs  T(C): 36.7 (01 Sep 2023 09:03), Max: 36.7 (31 Aug 2023 21:56)  T(F): 98 (01 Sep 2023 09:03), Max: 98.1 (31 Aug 2023 21:56)  HR: 75 (01 Sep 2023 09:03) (70 - 82)  BP: 110/72 (01 Sep 2023 09:03) (103/59 - 120/73)  BP(mean): 72 (01 Sep 2023 05:14) (72 - 72)  RR: 18 (01 Sep 2023 09:03) (18 - 20)  SpO2: 98% (01 Sep 2023 09:03) (97% - 98%)    Parameters below as of 01 Sep 2023 09:03  Patient On (Oxygen Delivery Method): room air      Daily Height in cm: 160.02 (31 Aug 2023 21:56)    Daily     LABS:                        9.8    8.03  )-----------( 228      ( 01 Sep 2023 10:33 )             30.8     Mean Cell Volume: 76.4 fl (09-01-23 @ 10:33)    09-01    137  |  101  |  15  ----------------------------<  105<H>  3.8   |  24  |  0.85    Ca    9.5      01 Sep 2023 10:33  Phos  2.9     09-01  Mg     1.9     09-01    TPro  7.1  /  Alb  3.5  /  TBili  0.4  /  DBili  x   /  AST  39  /  ALT  50<H>  /  AlkPhos  149<H>  09-01    LIVER FUNCTIONS - ( 01 Sep 2023 10:33 )  Alb: 3.5 g/dL / Pro: 7.1 g/dL / ALK PHOS: 149 U/L / ALT: 50 U/L / AST: 39 U/L / GGT: x             Urinalysis Basic - ( 01 Sep 2023 10:33 )    Color: x / Appearance: x / SG: x / pH: x  Gluc: 105 mg/dL / Ketone: x  / Bili: x / Urobili: x   Blood: x / Protein: x / Nitrite: x   Leuk Esterase: x / RBC: x / WBC x   Sq Epi: x / Non Sq Epi: x / Bacteria: x      Amylase Serum--      Lipase serum24       Ammonia--                          9.8    8.03  )-----------( 228      ( 01 Sep 2023 10:33 )             30.8                         10.1   10.58 )-----------( 259      ( 01 Sep 2023 01:31 )             31.4     Imaging:    PROCEDURE:  CT of the Chest was performed.  Sagittal and coronal reformats were performed.    FINDINGS:    LUNGS AND AIRWAYS: Patent central airways. Trace atelectasis in the right lower lobe. Approximately 2 mm right lower lobe pulmonary nodule (2:62 and 3:248-49) is partially obscured by motion artifact; this appears to been present on 03/21/2021.  PLEURA: No pleural effusion.  MEDIASTINUM AND ILIANA: No lymphadenopathy.  VESSELS: No thoracic aortic aneurysm or gross dissection. Mild atherosclerotic calcification of the subclavian arteries..  HEART: Heart size is normal. Qualitatively there is mild to moderate atherosclerotic calcification of the coronary arteries. No pericardial effusion.  CHEST WALL AND LOWER NECK: Within normal limits.  VISUALIZED UPPER ABDOMEN: There is wall thickening versus underdistention in the partially visualized distal transverse colon and proximal descending colon. Approximately 1.2 cm right renal cyst (2:120).  BONES: Anterior vertebral marginal osteophytes in the thoracic spine. Hemangiomas in the T11 vertebral body.    IMPRESSION:  No CT evidence of pneumonia or pulmonary edema. No intrathoracic mass is visualized by CT technique.    Wall thickening versus underdistention in the partially visualized distal transverse colon and proximal descending colon. Colitis should be excluded clinically.    An incidental 2 mm right lower lobe pulmonary nodule is partially obscured by motion artifact; this appears to be present on 03/21/2021.

## 2023-09-01 NOTE — H&P ADULT - NSHPREVIEWOFSYSTEMS_GEN_ALL_CORE
CONSTITUTIONAL/GENERAL: No weakness, fevers but has chills x2 days  EYES/OPHTHALMOLOGIC: No visual changes, No blurry vision, No vertigo   ENMT: No throat pain, or neck stiffness   RESPIRATORY/THORAX: No cough, wheezing, hemoptysis; No shortness of breath  CARDIOVASCULAR: No chest pain or palpitations  GASTROINTESTINAL: No abdominal or epigastric pain. + nausea, vomiting as described in hpi, no hematemesis; No diarrhea or constipation. No melena or hematochezia.  GENITOURINARY: No dysuria, frequency or hematuria  NEUROLOGICAL: No numbness or weakness  SKIN: No itching, rashes  ENDOCRINOLOGY: no heat intolerance, no cold intolerance, no weight gain, no weight loss  PSYCHIATRIC:  no si/no hi, mood stable, no anxiety  MUSCULOSKELETAL SYSTEM:  no joint pain, no myalgias, no arthralgias, no joint swelling

## 2023-09-01 NOTE — H&P ADULT - PROBLEM SELECTOR PLAN 4
Alk-phos 167, AST 57 and ALT 59  - unclear etiology, no abdominal pain at this time  - obtain hepatitis panel, obtain RUQUS

## 2023-09-01 NOTE — H&P ADULT - HISTORY OF PRESENT ILLNESS
Patient is a 78yof w/ hx of HTN, HLD, Depression, Anxiety, ?Dementia who presents with nausea vomiting for the past 2 days. States that she has been having few episodes of nbnb vomiting for the past 2 days and felt chills but no true fever. Patient states she is unable to tolerate any solid foods in over 2 days because it causes nausea. States she also has been having decreaesd appetite for 4-5 months, and has lost 10lbs since june. States she also has been having difficulty swallowing solid foods and ocassionally feels acid reflux. Around 5 months ago, she has an episode of seeing bright red blood on the toilet paper when wiping. She went to [facility] 2 weeks ago for dizziness and was discharged with meclizine. About one month ago she had green sputum production and was discharged with medrol dose pack and azithromycin which she finished. Also endorses mild headache on her forehead area. Denies any abdominal pain, constipation (has been having regular bms), fatigue, weakness, diarrhea, pain with swallowing.

## 2023-09-01 NOTE — H&P ADULT - PROBLEM SELECTOR PLAN 5
c/w home amitriptyline  - was recently started on rexulti and trintellix but has not started it yet. Can be started oupatietn when she sees her PCP

## 2023-09-01 NOTE — H&P ADULT - NSHPPHYSICALEXAM_GEN_ALL_CORE
GENERAL: NAD, well-developed  HEAD:  Atraumatic, Normocephalic  EYES: EOMI, PERRLA, conjunctiva and sclera clear  NECK: Supple, No JVD  CHEST/LUNG: Clear to auscultation bilaterally; No wheeze  HEART: Regular rate and rhythm; No murmurs, rubs, or gallops  ABDOMEN: Soft, Nontender, Nondistended; Bowel sounds present  EXTREMITIES:  2+ Peripheral Pulses, No clubbing, cyanosis, or edema  PSYCH: AAOx3, appropriate affect  NEUROLOGY: non-focal, silva  SKIN: No rashes or lesions GENERAL: NAD, well-developed,  HEAD:  Atraumatic, Normocephali  ENMT: dry mucous membranes   EYES: EOMI, PERRLA, conjunctiva and sclera clear  NECK: Supple, No JVD  CHEST/LUNG: Clear to auscultation bilaterally; No wheeze  HEART: Regular rate and rhythm; No murmurs, rubs, or gallops  ABDOMEN: Soft, Nontender, Nondistended; Bowel sounds present  EXTREMITIES:  2+ Peripheral Pulses, No clubbing, cyanosis, or edema  PSYCH: AAOx3, appropriate affect  NEUROLOGY: non-focal, silva  SKIN: No rashes or lesions

## 2023-09-01 NOTE — ED PROVIDER NOTE - CLINICAL SUMMARY MEDICAL DECISION MAKING FREE TEXT BOX
Attending (Luis Alberto Strange D.O.):  78F hx of htn, hld here for inability to tolerate PO for 5 days, fever tmax >101 but not today, nausea, NBNB. Denies trauma, chest pain. Has been hving a nonprod cough and intermittent shortness of breath. Denies weight changes. Had outpt CT done for abdomen which was nonactionable. Spoke with her GI who ref her to ED for further eval, +/- endo/colonoscopy. Here, patient hemodynam stable, NAD, no resp distress, no inc wob. No LE edema, benign abd, no jaundice, no signs of anemia. Need to eval for acute pulm process, esophagael mass, other compression. Plan to obtain labs, CT chest with iv contrast, mayb eventually need barium swallow or endocopy, treat with ppi and anticipate admission.

## 2023-09-01 NOTE — ED PROVIDER NOTE - OBJECTIVE STATEMENT
see mdm see mdm    PCP: Dr. Jenny Doan  *** Daughter has CT a/p with iv contrast report from 8/30/2023 done at [facility]. DO NOT RESCAN ***

## 2023-09-01 NOTE — CONSULT NOTE ADULT - ATTENDING COMMENTS
79 yo F pmh HTN, HL, depression/anxiety presenting with multiple GI complaints.  Limited historian, but appears to have been having weight loss for the past 5 months due to some decreased appetite.  More recently having 3 weeks of N/V in setting of recent infection and for past 5 days now having worse n/v with diarrhea intermittently.  She has some SOB on admit so had chest ct scan which captured possible colitis on ct scan in limited view of abdomen on this CT scan.  Suspect there is infectious component persisting/recurring at this time.    - would provide supportive care via antiemetic at this time  - consider stool pcr, and c diff  - IVF  - agree with ct abdomen/pelvis given atypical finding on small section of CT scan of abdomen  - If persisting symptoms and negative studies, may warrant endoscopy next week

## 2023-09-01 NOTE — H&P ADULT - PROBLEM SELECTOR PLAN 3
Presented with Na 128 and dehydration, seems to be hypovolemic hyponatremia  - improved with IVF to 137, overcorrected. As such will give small amount of D5 and recheck BMP in the afternoon

## 2023-09-01 NOTE — H&P ADULT - PROBLEM SELECTOR PLAN 1
Patient presenting with n/v for 2 days and inability to tolerate po intake. Has dysphagia symptoms and states she also has acid reflux. CT also showing wall thickening of colon, concerning for colitis. May be related to recent viral illness (+enterovirus on 8/10)  - GI consulted, f/u recs. patient may need inpatient EGD +/- C-Scope  - Obtain speech and swallow  - Zofran PRN for nausea  - c/w ppi for now

## 2023-09-01 NOTE — H&P ADULT - PROBLEM SELECTOR PLAN 2
CT also showing wall thickening of colon, concerning for colitis, states she had chills but does not have any documented fevers  - f/u BCx  - will start ceftriaxone and flagyl for now, if cultures negative, will dc abx   - f/u final GI recs

## 2023-09-01 NOTE — ED PROVIDER NOTE - PROGRESS NOTE DETAILS
Attending (Luis Alberto Strange D.O.):  CT nonactionable. Family and patient states she was sent in by own GI for admission for GI workup/endo/colonoscopy. All discussed with fam. Remains hemodynam stable. Will admit. Attending (Luis Alberto Strange D.O.):  CT nonactionable. Family and patient states she was sent in by own GI for admission for GI workup/endo/colonoscopy. All discussed with fam. Remains hemodynam stable. [facility] Will [facility]

## 2023-09-01 NOTE — H&P ADULT - NSHPSOCIALHISTORY_GEN_ALL_CORE
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

## 2023-09-01 NOTE — H&P ADULT - ASSESSMENT
Patient is a 78yof w/ hx of HTN, HLD, Depression, Anxiety, ?Dementia who presents with nausea vomiting for the past 2 days, admitted for further workup.

## 2023-09-01 NOTE — ED ADULT NURSE NOTE - NSFALLRISKINTERV_ED_ALL_ED

## 2023-09-01 NOTE — CONSULT NOTE ADULT - ASSESSMENT
Patient is a 79 yo female w/ hx of HTN, HLD, Depression, Anxiety, ?Dementia who is being admitted for nausea/vomiting, and an inability to tolerate any PO intake over the last 2 days.     1. 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 Diarrhea  - patient reports 2 episodes of non-bloody watery diarrhea over the last 5 days  - if patient develops another episode of diarrhea would send stool studies, cultures, and C.diff   - CT showed wall thickening versus underdistention in the partially visualized distal transverse colon and proximal descending colon  - can consider dedicated CT A+P with IV contrast   - patient is afebrile and abdominal exam with minimal abdominal tenderness, would hold off on abx   - given unintentional weight loss and anemia, patient will need an outpatient colonoscopy    Note and recommendations not final until case discussed with attending, Dr. Olmedo  Patient is a 77 yo female w/ hx of HTN, HLD, Depression, Anxiety, ?Dementia who is being admitted for nausea/vomiting, and an inability to tolerate any PO intake over the last 2 days.     1. Nausea/ vomiting   - for nausea, PRN IV zofran 4mg q8h, monitor QTc   - if patient unable to tolerate PO over the next 24hrs, will consider inpatient EGD  - patient reports she had a normal EGD 2 years ago  - would start daily PPI, IV if patient not tolerating PO diet     2. Diarrhea  - patient reports 2 episodes of non-bloody watery diarrhea over the last 5 days  - if patient develops another episode of diarrhea would send stool cultures and GI PCR  - CT showed wall thickening versus underdistention in the partially visualized distal transverse colon and proximal descending colon  - would obtain dedicated CT A+P with IV contrast   - patient is afebrile and abdominal exam with minimal abdominal tenderness, would hold off on abx   - given unintentional weight loss and anemia, patient will need an outpatient colonoscopy    Case discussed with attending, Dr. Olmedo

## 2023-09-01 NOTE — ED PROVIDER NOTE - ATTENDING CONTRIBUTION TO CARE
Attending (Luis Alberto Strange D.O.):  I have personally seen and examined this patient. I have performed a substantive portion of the visit including all aspects of the medical decision making. Resident, fellow, student, and/or ACP note reviewed. I agree on the plan of care except where noted.    see mdm

## 2023-09-02 DIAGNOSIS — A49.9 BACTERIAL INFECTION, UNSPECIFIED: ICD-10-CM

## 2023-09-02 DIAGNOSIS — A41.89 OTHER SPECIFIED SEPSIS: ICD-10-CM

## 2023-09-02 LAB
-  COAGULASE NEGATIVE STAPHYLOCOCCUS: SIGNIFICANT CHANGE UP
CULTURE RESULTS: SIGNIFICANT CHANGE UP
GRAM STN FLD: SIGNIFICANT CHANGE UP
HAV IGM SER-ACNC: SIGNIFICANT CHANGE UP
HBV CORE IGM SER-ACNC: SIGNIFICANT CHANGE UP
HBV SURFACE AG SER-ACNC: SIGNIFICANT CHANGE UP
HCV AB S/CO SERPL IA: 0.05 S/CO — SIGNIFICANT CHANGE UP (ref 0–0.99)
HCV AB SERPL-IMP: SIGNIFICANT CHANGE UP
METHOD TYPE: SIGNIFICANT CHANGE UP
ORGANISM # SPEC MICROSCOPIC CNT: SIGNIFICANT CHANGE UP
ORGANISM # SPEC MICROSCOPIC CNT: SIGNIFICANT CHANGE UP
SPECIMEN SOURCE: SIGNIFICANT CHANGE UP
SPECIMEN SOURCE: SIGNIFICANT CHANGE UP

## 2023-09-02 PROCEDURE — 70450 CT HEAD/BRAIN W/O DYE: CPT | Mod: 26

## 2023-09-02 PROCEDURE — 99233 SBSQ HOSP IP/OBS HIGH 50: CPT | Mod: FS

## 2023-09-02 RX ORDER — FERROUS SULFATE 325(65) MG
325 TABLET ORAL DAILY
Refills: 0 | Status: DISCONTINUED | OUTPATIENT
Start: 2023-09-02 | End: 2023-09-11

## 2023-09-02 RX ORDER — VANCOMYCIN HCL 1 G
1000 VIAL (EA) INTRAVENOUS ONCE
Refills: 0 | Status: COMPLETED | OUTPATIENT
Start: 2023-09-02 | End: 2023-09-02

## 2023-09-02 RX ORDER — SODIUM CHLORIDE 9 MG/ML
1000 INJECTION, SOLUTION INTRAVENOUS
Refills: 0 | Status: DISCONTINUED | OUTPATIENT
Start: 2023-09-02 | End: 2023-09-03

## 2023-09-02 RX ORDER — LACTOBACILLUS ACIDOPHILUS 100MM CELL
1 CAPSULE ORAL
Refills: 0 | Status: DISCONTINUED | OUTPATIENT
Start: 2023-09-02 | End: 2023-09-11

## 2023-09-02 RX ADMIN — Medication 100 MILLIGRAM(S): at 22:20

## 2023-09-02 RX ADMIN — LINACLOTIDE 145 MICROGRAM(S): 145 CAPSULE, GELATIN COATED ORAL at 05:54

## 2023-09-02 RX ADMIN — Medication 250 MILLIGRAM(S): at 10:45

## 2023-09-02 RX ADMIN — CEFTRIAXONE 100 MILLIGRAM(S): 500 INJECTION, POWDER, FOR SOLUTION INTRAMUSCULAR; INTRAVENOUS at 13:16

## 2023-09-02 RX ADMIN — PANTOPRAZOLE SODIUM 40 MILLIGRAM(S): 20 TABLET, DELAYED RELEASE ORAL at 05:54

## 2023-09-02 RX ADMIN — SODIUM CHLORIDE 60 MILLILITER(S): 9 INJECTION, SOLUTION INTRAVENOUS at 18:17

## 2023-09-02 RX ADMIN — Medication 325 MILLIGRAM(S): at 13:16

## 2023-09-02 RX ADMIN — LOSARTAN POTASSIUM 50 MILLIGRAM(S): 100 TABLET, FILM COATED ORAL at 10:46

## 2023-09-02 RX ADMIN — ENOXAPARIN SODIUM 40 MILLIGRAM(S): 100 INJECTION SUBCUTANEOUS at 13:16

## 2023-09-02 RX ADMIN — Medication 100 MILLIGRAM(S): at 05:54

## 2023-09-02 RX ADMIN — Medication 1 APPLICATION(S): at 22:20

## 2023-09-02 RX ADMIN — Medication 10 MILLIGRAM(S): at 13:15

## 2023-09-02 RX ADMIN — Medication 1 TABLET(S): at 18:16

## 2023-09-02 RX ADMIN — ATORVASTATIN CALCIUM 40 MILLIGRAM(S): 80 TABLET, FILM COATED ORAL at 22:20

## 2023-09-02 RX ADMIN — Medication 100 MILLIGRAM(S): at 15:11

## 2023-09-02 NOTE — PROGRESS NOTE ADULT - PROBLEM SELECTOR PLAN 1
Blood c/s shows GPC in clusters, will f/u full c/s report, may be contaminant  - WBC wnl, afebrile- no sepsis  - 1 gm IV vancomycin ordered  - repeat blood c/s in am

## 2023-09-02 NOTE — OCCUPATIONAL THERAPY INITIAL EVALUATION ADULT - LIVES WITH, PROFILE
Lives in apartment alone with no steps to enter and elevator to floor, HHA 5-6hrs/day 5 days a week and 4hrs on sunday/alone

## 2023-09-02 NOTE — OCCUPATIONAL THERAPY INITIAL EVALUATION ADULT - PERTINENT HX OF CURRENT PROBLEM, REHAB EVAL
78yof w/ hx of HTN, HLD, Depression, Anxiety, ?Dementia who presents with nausea vomiting for the past 2 days. States that she has been having few episodes of nbnb vomiting for the past 2 days and felt chills but no true fever. Patient states she is unable to tolerate any solid foods in over 2 days because it causes nausea. States she also has been having decreaesd appetite for 4-5 months, and has lost 10lbs since june. States she also has been having difficulty swallowing solid foods and ocassionally feels acid reflux. Around 5 months ago, she has an episode of seeing bright red blood on the toilet paper when wiping. She went to [facility] 2 weeks ago for dizziness and was discharged with meclizine. About one month ago she had green sputum production and was discharged with medrol dose pack and azithromycin which she finished. Also endorses mild headache on her forehead area.    CT Abdomen/pelvis No bowel obstruction, free air or abscess. Trace fluid within the right side of pelvis.  CT chest No CT evidence of pneumonia or pulmonary edema.  No intrathoracic mass is visualized by CT technique.Wall thickening versus underdistention in the partially visualized distal transverse colon and proximal descending colon.  Colitis should be excluded clinically.An incidental 2 mm right lower lobe pulmonary nodule is partially obscured by motion artifact;

## 2023-09-02 NOTE — PROGRESS NOTE ADULT - PROBLEM SELECTOR PLAN 2
CT also showing wall thickening of colon, concerning for colitis, states she had chills but does not have any documented fevers, Blood c/s shows GPC in clusters  - c/w IV ceftriaxone and flagyl for now  - stool c/s and GI pcr ordered  - f/u final GI recs

## 2023-09-02 NOTE — PHYSICAL THERAPY INITIAL EVALUATION ADULT - ADDITIONAL COMMENTS
pt lives in an apartment with elevator access, no stairs. pt has a home health aide 5 hours a week, M-F and 4 hours on Sunday. Pt ambulates with a straight cane and owns shower chair and grab bars.

## 2023-09-02 NOTE — PROGRESS NOTE ADULT - SUBJECTIVE AND OBJECTIVE BOX
Mohsin Khan, MD  Attending Physician, Division Of Hospital Medicine  Pager: [phone], Office: [phone]  Off hour pager: [phone]    Patient is a 78y old  Female who presents with a chief complaint of nausea, vomiting     SUBJECTIVE / OVERNIGHT EVENTS:  Seen, examined the patient this am  Just came out of bathroom, feels weak, no abdominal pain, SOB, afebrile, low appetite, VSS    MEDICATIONS  (STANDING):  amitriptyline 10 milliGRAM(s) Oral daily  atorvastatin 40 milliGRAM(s) Oral at bedtime  cefTRIAXone   IVPB 1000 milliGRAM(s) IV Intermittent every 24 hours  dextrose 5%. 1000 milliLiter(s) (50 mL/Hr) IV Continuous <Continuous>  enoxaparin Injectable 40 milliGRAM(s) SubCutaneous every 24 hours  linaclotide 145 MICROGram(s) Oral before breakfast  losartan 50 milliGRAM(s) Oral daily  metroNIDAZOLE  IVPB 500 milliGRAM(s) IV Intermittent every 8 hours  metroNIDAZOLE  IVPB      Namzaric(Memantine &amp; Donepezil)28/10mg 1 Capsule(s)   Oral daily  pantoprazole    Tablet 40 milliGRAM(s) Oral before breakfast    MEDICATIONS  (PRN):      Vital Signs Last 24 Hrs  T(C): 36.8 (02 Sep 2023 04:46), Max: 37.3 (01 Sep 2023 19:01)  T(F): 98.2 (02 Sep 2023 04:46), Max: 99.1 (01 Sep 2023 19:01)  HR: 96 (02 Sep 2023 08:10) (71 - 96)  BP: 116/74 (02 Sep 2023 08:10) (93/57 - 116/74)  BP(mean): --  RR: 18 (02 Sep 2023 04:46) (18 - 18)  SpO2: 97% (02 Sep 2023 08:10) (95% - 100%)    Parameters below as of 02 Sep 2023 08:10  Patient On (Oxygen Delivery Method): room air      CAPILLARY BLOOD GLUCOSE        I&O's Summary      PHYSICAL EXAM:-  GENERAL: NAD, well-developed  EYES: EOMI, PERRLA, conjunctiva and sclera clear  NECK: Supple, No JVD, no thyromegaly  CHEST/LUNG: Clear to auscultation bilaterally; No wheeze  HEART: Regular rate and rhythm; S1, S2 audible, No murmurs, rubs, or gallops  ABDOMEN: Soft, Nontender, Nondistended; Bowel sounds present  EXTREMITIES:  2+ Peripheral Pulses, No clubbing, cyanosis, or edema  NEURO: AAOx3, no focal deficit      LABS:                        9.8    8.03  )-----------( 228      ( 01 Sep 2023 10:33 )             30.8     09-01    134<L>  |  101  |  12  ----------------------------<  109<H>  3.9   |  20<L>  |  0.82    Ca    9.3      01 Sep 2023 22:28  Phos  2.6     09-01  Mg     1.8     09-01    TPro  7.1  /  Alb  3.5  /  TBili  0.4  /  DBili  x   /  AST  39  /  ALT  50<H>  /  AlkPhos  149<H>  09-01      Urinalysis Basic - ( 01 Sep 2023 22:28 )    Color: x / Appearance: x / SG: x / pH: x  Gluc: 109 mg/dL / Ketone: x  / Bili: x / Urobili: x   Blood: x / Protein: x / Nitrite: x   Leuk Esterase: x / RBC: x / WBC x   Sq Epi: x / Non Sq Epi: x / Bacteria: x        RADIOLOGY & ADDITIONAL TESTS:    Imaging Personally Reviewed: CXR, CT c/a/p  Consultant(s) Notes Reviewed: GI

## 2023-09-02 NOTE — PATIENT PROFILE ADULT - FUNCTIONAL ASSESSMENT - BASIC MOBILITY 6.
3-calculated by average/Not able to assess (calculate score using [facility] averaging method)

## 2023-09-02 NOTE — PATIENT PROFILE ADULT - NSTRANSFERBELONGINGSDISPO_GEN_A_NUR
After Visit Summary      Facility     Name Address Phone       [facility] [address] [facility] [address] [phone]            Patient Discharge Summary   3/28/2017    Richie Bright            Please bring this medication reconciliation form to your next doctor’s appointment(s). Please update the form if you stop taking any of these medications or you start taking any new medications including over the counter medications. Also please carry a copy of this form with you at all times in the event of an emergency.    A copy of these discharge instructions was reviewed with and given to the patient/patient representative/Guardian/Caregiver at discharge.          The doctor who took care of you in the hospital     Provider Specialty    Jono Luevano MD Emergency Medicine    Ruslan Arambula MD Emergency Medicine    Ulises Flores MD Internal Medicine    Simeon Emery MD Internal Medicine    Santo Cao MD Internal Medicine      Allergies as of 3/31/2017        Reactions    Adhesive PRURITUS    Bandages. Redness. Tegaderm OK    Contrast Media Other (See Comments)    Chills, feels bad, shaky.    Cymbalta [Duloxetine Hcl] DIARRHEA    Heartburn.  Stomach cramps.     Hctz [Hydrochlorothiazide]     arf    Sulfa Drugs Cross Reactors RASH    Penicillins     Pt. Unsure of reaction.           Discharge Medications              What to Do with Your Medications      START taking these medications today unless otherwise stated        Details    Morning Noon Evening Bedtime As needed    umeclidinium-vilanterol 62.5-25 MCG/INH inhaler   Commonly known as:  ANORO ELLIPTA        Inhale 1 puff into the lungs daily.   Authorizing Provider:  Santo Cao   Last Dose:  1 puff on 3/30/2017  9:46 AM                            warfarin 2.5 MG tablet   Commonly known as:  COUMADIN        Take 2 tablets by mouth daily.   Authorizing Provider:  Santo Cao      Last Dose:  5 mg on 3/30/2017  5:14 PM                                                     CONTINUE taking these medications which have CHANGED        Details    Morning Noon Evening Bedtime As needed    furosemide 40 MG tablet   Commonly known as:  LASIX   What changed:  See the new instructions.        Take 2 tablets by mouth daily.   Authorizing Provider:  Santo Cao   Last Dose:  80 mg on 3/29/2017  8:59 AM                            predniSONE 10 MG tablet   Commonly known as:  DELTASONE   What changed:    - how much to take  - how to take this  - when to take this  - additional instructions        1 tablet daily   Authorizing Provider:  Jono Yarbrough   Last Dose:  10 mg on 3/31/2017  8:59 AM                                                     CONTINUE taking these medications which have NOT CHANGED        Details    Morning Noon Evening Bedtime As needed    allopurinol 100 MG tablet   Commonly known as:  ZYLOPRIM        Take 1 tablet by mouth daily.   Authorizing Provider:  Lenny Ardon   Last Dose:  100 mg on 3/31/2017  8:59 AM                            amLODIPine 10 MG tablet   Commonly known as:  NORVASC        Take 1 tablet by mouth daily.   Authorizing Provider:  Ernesto Menendez   Last Dose:  10 mg on 3/31/2017  8:59 AM                            ammonium lactate 12 % lotion   Commonly known as:  AMLACTIN        Apply 1 application topically daily as needed for Dry Skin. Apply to affected area(s)                            amphetamine-dextroamphetamine 30 MG tablet   Commonly known as:  ADDERALL        1-1.5 tabs AM; 1 tab Noon --Max dose 75 mg daily   Authorizing Provider:  Silvino Pinto                            aspirin 81 MG chewable tablet        Chew 1 tablet by mouth daily.   Authorizing Provider:  Toby Arroyo   Last Dose:  81 mg on 3/31/2017  8:59 AM                            buPROPion 100 MG 12 hr tablet   Commonly known as:  WELLBUTRIN SR        Take 100 mg by mouth 2 times  daily.   Last Dose:  100 mg on 3/31/2017  8:59 AM                            CENTRUM SILVER ADULT 50+ PO        Take 1 tablet by mouth daily.                            escitalopram 20 MG tablet   Commonly known as:  LEXAPRO        TAKE 1 TABLET BY MOUTH DAILY. THIS IS IN PLACE OF CITALOPRAM   Authorizing Provider:  Lenny Ardon   Comment:  Last refill until f/u with Dr. Ardon   Last Dose:  20 mg on 3/31/2017  8:59 AM                            folic acid 400 MCG tablet   Commonly known as:  FOLATE        Take 400 mcg by mouth nightly.                            gabapentin 300 MG capsule   Commonly known as:  NEURONTIN        Take 900 mg by mouth nightly. Dose: 3 caps (= 900 mg)   Last Dose:  900 mg on 3/30/2017  8:09 PM                            insulin  UNIT/ML injectable suspension   Commonly known as:  NOVOLIN N RELION        Inject 15 Units into the skin 2 times daily.   Authorizing Provider:  Estephania Christopher   Last Dose:  15 Units on 3/31/2017  8:58 AM                            mometasone 0.1 % ointment   Commonly known as:  ELOCON        Apply sparingly BID to affected areas in back   Authorizing Provider:  Lenny Ardon                            NOVOLOG FLEXPEN SC        Inject 2-5 Units into the skin 3 times daily (before meals). Adjust per sliding scale                            pravastatin 40 MG tablet   Commonly known as:  PRAVACHOL        Take 1 tablet by mouth nightly.   Authorizing Provider:  Toby CASTELLANOAIR  (90 Base) MCG/ACT inhaler        Generic drug:  albuterol   Inhale 2 puffs into the lungs every 4 hours as needed for Shortness of Breath or Wheezing.                            rOPINIRole 0.25 MG tablet   Commonly known as:  REQUIP        Take 0.25 mg by mouth nightly.   Last Dose:  0.25 mg on 3/30/2017  8:09 PM                                                        Where to Get Your Medications      These medications were sent to  [id number] - [place], [place] - [address]  [address] [facility] [address]     Phone:  [phone]     furosemide 40 MG tablet    umeclidinium-vilanterol 62.5-25 MCG/INH inhaler         You are receiving a paper prescription for the following medications, you can take these to any pharmacy.     Bring a paper prescription for each of these medications     warfarin 2.5 MG tablet               Your To Do List     Future Appointments Provider Department Dept Phone    4/3/2017 11:20 AM Lenny Ardon MD [facility] [phone]    4/4/2017 2:30 PM [facility] ANTI COAG CLINIC [facility]ticoag[facility] [phone]    4/5/2017 12:00 PM Ernesto Menendez MD [facility] [address] [phone]    4/10/2017 8:30 AM Radha Garcia MD [facility] Cardiovascular [address] [phone]    6/6/2017 11:10 AM Estephania Christopher MD [facility] Endocrinology [address] [phone]    Future Orders Complete By Expires    SERVICE TO ANTICO CLINIC  As directed       Discharge Instructions     None      Discharge References/Attachments     [facility] FYW [id number] WARFARIN (COUMADIN, JANTOVEN) (ENGLISH)      Pending Labs/Results     Any lab or diagnostic test results that are \"pending\" at time of discharge are listed below. If no results display then none were \"pending\" at time of discharge. Depending on when the test was completed results may be available within 3-5 days. Results can be reviewed with your provider at your next visits or through [employer]. If needed contact the provider office for additional information.          Order Current Status    Aerobic Culture and Smear Collected (03/30/17 1520)    Anaerobic and Aerobic Culture and Smear Preliminary result       Your Opinion Matters To Us  If you receive a patient satisfaction survey in the mail, please complete and return it in the postage-paid envelope.     We truly value and appreciate your feedback.           Richie Bright        Your To Do List     Future Appointments Provider Department Dept Phone    4/3/2017 11:20 AM Lenny Ardon MD [facility] Family Practice Services [phone]    4/4/2017 2:30 PM [facility] ANTI COAG CLINIC [facility] Anticoag/Medication Management Clinic [phone]    4/5/2017 12:00 PM Ernesto Menendez MD [facility] [address] [phone]    4/10/2017 8:30 AM Radha Garcia MD [facility] Cardiovascular [address] [phone]    6/6/2017 11:10 AM Estephania Christopher MD [facility] Endocrinology [address] [phone]    Future Orders Complete By Expires    SERVICE TO ANTICOAG CLINIC  As directed       Contact your doctor for follow-up appointment if not already scheduled.     Follow up with Lenny Ardon MD On 4/3/2017.    Specialty:  Family Practice    Comments:  TCM/hospital follow up appointment @ 11:20am    Contact information    [address] [facility] [address]  [phone]          Follow up with [facility] [address]. Go on 4/4/2017.    Specialty:  Family Practice    Comments:  Coumadin Clinic at 2:30 pm    Contact information    [address]  [address] [address]  [phone]        Follow up with Ernesto Menendez MD In 1 week.    Specialty:  Nephrology    Contact information    [address] [address]  [phone]           Richie Bright           Summary of your Discharge Medications      Take these Medications        Details    Morning Noon Evening Bedtime As needed    allopurinol 100 MG tablet   Commonly known as:  ZYLOPRIM    Take 1 tablet by mouth daily.                            amLODIPine 10 MG tablet   Commonly known as:  NORVASC    Take 1 tablet by mouth daily.                            ammonium lactate 12 % lotion   Commonly known as:  AMLACTIN    Apply 1  application topically daily as needed for Dry Skin. Apply to affected area(s)                            amphetamine-dextroamphetamine 30 MG tablet   Commonly known as:  ADDERALL    1-1.5 tabs AM; 1 tab Noon --Max dose 75 mg daily                            aspirin 81 MG chewable tablet    Chew 1 tablet by mouth daily.                            buPROPion 100 MG 12 hr tablet   Commonly known as:  WELLBUTRIN SR    Take 100 mg by mouth 2 times daily.                            CENTRUM SILVER ADULT 50+ PO    Take 1 tablet by mouth daily.                            escitalopram 20 MG tablet   Commonly known as:  LEXAPRO    TAKE 1 TABLET BY MOUTH DAILY. THIS IS IN PLACE OF CITALOPRAM   Comment:  Last refill until f/u with Dr. Ardon                            folic acid 400 MCG tablet   Commonly known as:  FOLATE    Take 400 mcg by mouth nightly.                            furosemide 40 MG tablet   Commonly known as:  LASIX    Take 2 tablets by mouth daily.                            gabapentin 300 MG capsule   Commonly known as:  NEURONTIN    Take 900 mg by mouth nightly. Dose: 3 caps (= 900 mg)                            insulin  UNIT/ML injectable suspension   Commonly known as:  NOVOLIN N RELION    Inject 15 Units into the skin 2 times daily.                            mometasone 0.1 % ointment   Commonly known as:  ELOCON    Apply sparingly BID to affected areas in back                            NOVOLOG FLEXPEN SC    Inject 2-5 Units into the skin 3 times daily (before meals). Adjust per sliding scale                            pravastatin 40 MG tablet   Commonly known as:  PRAVACHOL    Take 1 tablet by mouth nightly.                            predniSONE 10 MG tablet   Commonly known as:  DELTASONE    1 tablet daily                            PROAIR  (90 Base) MCG/ACT inhaler    Generic drug:  albuterol   Inhale 2 puffs into the lungs every 4 hours as needed for Shortness of Breath or Wheezing.                             rOPINIRole 0.25 MG tablet   Commonly known as:  REQUIP    Take 0.25 mg by mouth nightly.                            umeclidinium-vilanterol 62.5-25 MCG/INH inhaler   Commonly known as:  ANORO ELLIPTA    Inhale 1 puff into the lungs daily.                            warfarin 2.5 MG tablet   Commonly known as:  COUMADIN    Take 2 tablets by mouth daily.                                                          Outpatient Administered Medication List      Notice     You have not been prescribed any facility-administered medications.       DISPLAY PLAN FREE TEXT with patient

## 2023-09-02 NOTE — PHYSICAL THERAPY INITIAL EVALUATION ADULT - PERTINENT HX OF CURRENT PROBLEM, REHAB EVAL
Patient is a 77yo w/ hx of HTN, HLD, Depression, Anxiety, Dementia who presents with nausea vomiting for the past 2 days. States that she has been having few episodes of nbnb vomiting for the past 2 days and felt chills but no true fever. Patient states she is unable to tolerate any solid foods in over 2 days because it causes nausea. States she also has been having decreased appetite for 4-5 months, and has lost 10lbs since june. States she also has been having difficulty swallowing solid foods and occasionally feels acid reflux.   CXR: Clear lungs.   US ABD: Status post cholecystectomy. No biliary dilatation. Hyperechoic renal cortical lesion may reflect a small angiomyolipoma, but is not definitively characterized.  CT Chest: No CT evidence of pneumonia or pulmonary edema.  No intrathoracic mass is visualized by CT technique. Wall thickening versus underdistention in the partially visualized distal transverse colon and proximal descending colon. Colitis should be excluded clinically.  CT ABD/pelvis: No bowel obstruction, free air or abscess. Trace fluid within the right side of pelvis.

## 2023-09-02 NOTE — PHYSICAL THERAPY INITIAL EVALUATION ADULT - GENERAL OBSERVATIONS, REHAB EVAL
pt received pt received sitting edge of bed, A&0x4, +[place], following 100% of all simple commands

## 2023-09-02 NOTE — PHYSICAL THERAPY INITIAL EVALUATION ADULT - ACTIVE RANGE OF MOTION EXAMINATION, REHAB EVAL
peggy. upper extremity Active ROM was WNL (within normal limits)/bilateral lower extremity Active ROM was WNL (within normal limits)

## 2023-09-02 NOTE — PATIENT PROFILE ADULT - FALL HARM RISK - HARM RISK INTERVENTIONS
Assistance with ambulation/Assistance OOB with selected safe patient handling equipment/Communicate Risk of Fall with Harm to all staff/Discuss with provider need for PT consult/Monitor gait and stability/Provide patient with walking aids - walker, cane, crutches/Reinforce activity limits and safety measures with patient and family/Sit up slowly, dangle for a short time, stand at bedside before walking/Tailored Fall Risk Interventions/Visual Cue: Yellow wristband and red socks/Bed in lowest position, wheels locked, appropriate side rails in place/Call bell, personal items and telephone in reach/Instruct patient to call for assistance before getting out of bed or chair/Non-slip footwear when patient is out of bed/[place] to call system/Physically safe environment - no spills, clutter or unnecessary equipment/Purposeful Proactive Rounding/Room/bathroom lighting operational, light cord in reach

## 2023-09-02 NOTE — PHYSICAL THERAPY INITIAL EVALUATION ADULT - NSPTDISCHREC_GEN_A_CORE
HPT to increase overall strength, balance, and endurance. Continued HHA and assist with ALL functional mobility. CM Cinthya aware./Home PT

## 2023-09-03 DIAGNOSIS — J30.2 OTHER SEASONAL ALLERGIC RHINITIS: ICD-10-CM

## 2023-09-03 LAB
ALBUMIN SERPL ELPH-MCNC: 3.1 G/DL — LOW (ref 3.3–5)
ALP SERPL-CCNC: 158 U/L — HIGH (ref 40–120)
ALT FLD-CCNC: 42 U/L — SIGNIFICANT CHANGE UP (ref 10–45)
ANION GAP SERPL CALC-SCNC: 14 MMOL/L — SIGNIFICANT CHANGE UP (ref 5–17)
AST SERPL-CCNC: 33 U/L — SIGNIFICANT CHANGE UP (ref 10–40)
BASOPHILS # BLD AUTO: 0.02 K/UL — SIGNIFICANT CHANGE UP (ref 0–0.2)
BASOPHILS NFR BLD AUTO: 0.2 % — SIGNIFICANT CHANGE UP (ref 0–2)
BILIRUB SERPL-MCNC: 0.4 MG/DL — SIGNIFICANT CHANGE UP (ref 0.2–1.2)
BUN SERPL-MCNC: 13 MG/DL — SIGNIFICANT CHANGE UP (ref 7–23)
CALCIUM SERPL-MCNC: 9.4 MG/DL — SIGNIFICANT CHANGE UP (ref 8.4–10.5)
CHLORIDE SERPL-SCNC: 102 MMOL/L — SIGNIFICANT CHANGE UP (ref 96–108)
CO2 SERPL-SCNC: 19 MMOL/L — LOW (ref 22–31)
CREAT SERPL-MCNC: 0.83 MG/DL — SIGNIFICANT CHANGE UP (ref 0.5–1.3)
EGFR: 73 ML/MIN/1.73M2 — SIGNIFICANT CHANGE UP
EOSINOPHIL # BLD AUTO: 0.29 K/UL — SIGNIFICANT CHANGE UP (ref 0–0.5)
EOSINOPHIL NFR BLD AUTO: 3.3 % — SIGNIFICANT CHANGE UP (ref 0–6)
GI PCR PANEL: SIGNIFICANT CHANGE UP
GLUCOSE SERPL-MCNC: 109 MG/DL — HIGH (ref 70–99)
HCT VFR BLD CALC: 29 % — LOW (ref 34.5–45)
HGB BLD-MCNC: 9.5 G/DL — LOW (ref 11.5–15.5)
IMM GRANULOCYTES NFR BLD AUTO: 0.7 % — SIGNIFICANT CHANGE UP (ref 0–0.9)
LYMPHOCYTES # BLD AUTO: 0.68 K/UL — LOW (ref 1–3.3)
LYMPHOCYTES # BLD AUTO: 7.7 % — LOW (ref 13–44)
MCHC RBC-ENTMCNC: 24.7 PG — LOW (ref 27–34)
MCHC RBC-ENTMCNC: 32.8 GM/DL — SIGNIFICANT CHANGE UP (ref 32–36)
MCV RBC AUTO: 75.3 FL — LOW (ref 80–100)
MONOCYTES # BLD AUTO: 0.72 K/UL — SIGNIFICANT CHANGE UP (ref 0–0.9)
MONOCYTES NFR BLD AUTO: 8.1 % — SIGNIFICANT CHANGE UP (ref 2–14)
NEUTROPHILS # BLD AUTO: 7.11 K/UL — SIGNIFICANT CHANGE UP (ref 1.8–7.4)
NEUTROPHILS NFR BLD AUTO: 80 % — HIGH (ref 43–77)
NRBC # BLD: 0 /100 WBCS — SIGNIFICANT CHANGE UP (ref 0–0)
PLATELET # BLD AUTO: 254 K/UL — SIGNIFICANT CHANGE UP (ref 150–400)
POTASSIUM SERPL-MCNC: 3.9 MMOL/L — SIGNIFICANT CHANGE UP (ref 3.5–5.3)
POTASSIUM SERPL-SCNC: 3.9 MMOL/L — SIGNIFICANT CHANGE UP (ref 3.5–5.3)
PROT SERPL-MCNC: 6.5 G/DL — SIGNIFICANT CHANGE UP (ref 6–8.3)
RBC # BLD: 3.85 M/UL — SIGNIFICANT CHANGE UP (ref 3.8–5.2)
RBC # FLD: 14.3 % — SIGNIFICANT CHANGE UP (ref 10.3–14.5)
SODIUM SERPL-SCNC: 135 MMOL/L — SIGNIFICANT CHANGE UP (ref 135–145)
VANCOMYCIN TROUGH SERPL-MCNC: <4 UG/ML — LOW (ref 10–20)
WBC # BLD: 8.88 K/UL — SIGNIFICANT CHANGE UP (ref 3.8–10.5)
WBC # FLD AUTO: 8.88 K/UL — SIGNIFICANT CHANGE UP (ref 3.8–10.5)

## 2023-09-03 PROCEDURE — 71045 X-RAY EXAM CHEST 1 VIEW: CPT | Mod: 26

## 2023-09-03 PROCEDURE — 99233 SBSQ HOSP IP/OBS HIGH 50: CPT | Mod: FS

## 2023-09-03 RX ORDER — ONDANSETRON 8 MG/1
4 TABLET, FILM COATED ORAL ONCE
Refills: 0 | Status: COMPLETED | OUTPATIENT
Start: 2023-09-03 | End: 2023-09-03

## 2023-09-03 RX ORDER — LORATADINE 10 MG/1
10 TABLET ORAL DAILY
Refills: 0 | Status: DISCONTINUED | OUTPATIENT
Start: 2023-09-03 | End: 2023-09-11

## 2023-09-03 RX ORDER — FLUTICASONE PROPIONATE 50 MCG
1 SPRAY, SUSPENSION NASAL
Refills: 0 | Status: DISCONTINUED | OUTPATIENT
Start: 2023-09-03 | End: 2023-09-11

## 2023-09-03 RX ADMIN — Medication 325 MILLIGRAM(S): at 11:56

## 2023-09-03 RX ADMIN — Medication 100 MILLIGRAM(S): at 21:40

## 2023-09-03 RX ADMIN — Medication 1 APPLICATION(S): at 13:09

## 2023-09-03 RX ADMIN — ENOXAPARIN SODIUM 40 MILLIGRAM(S): 100 INJECTION SUBCUTANEOUS at 11:56

## 2023-09-03 RX ADMIN — CEFTRIAXONE 100 MILLIGRAM(S): 500 INJECTION, POWDER, FOR SOLUTION INTRAMUSCULAR; INTRAVENOUS at 11:56

## 2023-09-03 RX ADMIN — PANTOPRAZOLE SODIUM 40 MILLIGRAM(S): 20 TABLET, DELAYED RELEASE ORAL at 06:50

## 2023-09-03 RX ADMIN — ATORVASTATIN CALCIUM 40 MILLIGRAM(S): 80 TABLET, FILM COATED ORAL at 21:40

## 2023-09-03 RX ADMIN — Medication 1 APPLICATION(S): at 05:31

## 2023-09-03 RX ADMIN — ONDANSETRON 4 MILLIGRAM(S): 8 TABLET, FILM COATED ORAL at 22:19

## 2023-09-03 RX ADMIN — Medication 100 MILLIGRAM(S): at 13:09

## 2023-09-03 RX ADMIN — Medication 1 TABLET(S): at 10:06

## 2023-09-03 RX ADMIN — Medication 10 MILLIGRAM(S): at 11:56

## 2023-09-03 RX ADMIN — LORATADINE 10 MILLIGRAM(S): 10 TABLET ORAL at 11:56

## 2023-09-03 RX ADMIN — Medication 1 APPLICATION(S): at 21:40

## 2023-09-03 RX ADMIN — Medication 100 MILLIGRAM(S): at 05:31

## 2023-09-03 RX ADMIN — Medication 1 TABLET(S): at 17:08

## 2023-09-03 NOTE — DIETITIAN INITIAL EVALUATION ADULT - NSFNSPHYEXAMSKINFT_GEN_A_CORE
Pt states UBW ~60-62 kg  (~134 pounds)  Dosing wt 149.9 pounds   130% IBW ( pounds)  Skin: no noted pressure injuries as per flowsheets

## 2023-09-03 NOTE — PROGRESS NOTE ADULT - SUBJECTIVE AND OBJECTIVE BOX
Mohsin Khan, MD  Attending Physician, Division Of Hospital Medicine  Pager: [phone], Office: [phone]  Off hour pager: [phone]    Patient is a 78y old  Female who presents with a chief complaint of nausea, vomiting     SUBJECTIVE / OVERNIGHT EVENTS:  Seen, examined the patient this am  Resting in bed, c/o some dry cough, had vomited yesterday after she had some potatos, c/o HA, neck pain, VSS    MEDICATIONS  (STANDING):  amitriptyline 10 milliGRAM(s) Oral daily  atorvastatin 40 milliGRAM(s) Oral at bedtime  cefTRIAXone   IVPB 1000 milliGRAM(s) IV Intermittent every 24 hours  enoxaparin Injectable 40 milliGRAM(s) SubCutaneous every 24 hours  ferrous    sulfate 325 milliGRAM(s) Oral daily  lactobacillus acidophilus 1 Tablet(s) Oral two times a day with meals  losartan 50 milliGRAM(s) Oral daily  metroNIDAZOLE  IVPB      metroNIDAZOLE  IVPB 500 milliGRAM(s) IV Intermittent every 8 hours  pantoprazole    Tablet 40 milliGRAM(s) Oral before breakfast  sodium chloride 0.45%. 1000 milliLiter(s) (60 mL/Hr) IV Continuous <Continuous>  triamcinolone 0.1% Cream 1 Application(s) Topical every 8 hours    MEDICATIONS  (PRN):      Vital Signs Last 24 Hrs  T(C): 36.5 (03 Sep 2023 05:30), Max: 37.2 (02 Sep 2023 21:50)  T(F): 97.7 (03 Sep 2023 05:30), Max: 98.9 (02 Sep 2023 21:50)  HR: 81 (03 Sep 2023 05:30) (77 - 98)  BP: 102/70 (03 Sep 2023 05:30) (101/65 - 102/70)  BP(mean): --  RR: 18 (03 Sep 2023 05:30) (18 - 18)  SpO2: 98% (03 Sep 2023 05:30) (97% - 98%)    Parameters below as of 03 Sep 2023 05:30  Patient On (Oxygen Delivery Method): room air      CAPILLARY BLOOD GLUCOSE        I&O's Summary    02 Sep 2023 07:01  -  03 Sep 2023 07:00  --------------------------------------------------------  IN: 240 mL / OUT: 0 mL / NET: 240 mL    03 Sep 2023 07:01  -  03 Sep 2023 09:42  --------------------------------------------------------  IN: 0 mL / OUT: 0 mL / NET: 0 mL        PHYSICAL EXAM:-  GENERAL: NAD, well-developed  EYES: EOMI, PERRLA, conjunctiva and sclera clear  NECK: Supple, No JVD, no thyromegaly  CHEST/LUNG: Clear to auscultation bilaterally; No wheeze  HEART: Regular rate and rhythm; S1, S2 audible, No murmurs, rubs, or gallops  ABDOMEN: Soft, Nontender, Nondistended; Bowel sounds present  EXTREMITIES:  2+ Peripheral Pulses, No clubbing, cyanosis, or edema  NEURO: AAOx3, no focal deficit      LABS:                        9.5    8.88  )-----------( 254      ( 03 Sep 2023 06:43 )             29.0     09-03    135  |  102  |  13  ----------------------------<  109<H>  3.9   |  19<L>  |  0.83    Ca    9.4      03 Sep 2023 06:43  Phos  2.6     09-01  Mg     1.8     09-01    TPro  6.5  /  Alb  3.1<L>  /  TBili  0.4  /  DBili  x   /  AST  33  /  ALT  42  /  AlkPhos  158<H>  09-03          Urinalysis Basic - ( 03 Sep 2023 06:43 )    Color: x / Appearance: x / SG: x / pH: x  Gluc: 109 mg/dL / Ketone: x  / Bili: x / Urobili: x   Blood: x / Protein: x / Nitrite: x   Leuk Esterase: x / RBC: x / WBC x   Sq Epi: x / Non Sq Epi: x / Bacteria: x        RADIOLOGY & ADDITIONAL TESTS:    Imaging Personally Reviewed: CXR, CT c/a/p  Consultant(s) Notes Reviewed: GI

## 2023-09-03 NOTE — PROGRESS NOTE ADULT - PROBLEM SELECTOR PLAN 1
CT also showing wall thickening of colon, concerning for colitis, states she had chills but does not have any documented fevers, Blood c/s shows GPC in clusters- possible contaminant  - c/w IV ceftriaxone and flagyl for now  - stool c/s and GI pcr ordered  - f/u final GI recs

## 2023-09-03 NOTE — DIETITIAN INITIAL EVALUATION ADULT - NSFNSGIIOFT_GEN_A_CORE
09-03-23 @ 07:01  -  09-03-23 @ 15:24  --------------------------------------------------------  OUT:    Stool (mL): 0 mL  Total OUT: 0 mL    Total NET: 0 mL

## 2023-09-03 NOTE — DIETITIAN INITIAL EVALUATION ADULT - ORAL INTAKE PTA/DIET HISTORY
Pt reports reduced appetite and PO intake PTA related to difficulty eating/swallowing   Reports not following specific diet/ diet restrictions PTA   Pt confirmed No known food allergies / food intolerances.

## 2023-09-03 NOTE — PROGRESS NOTE ADULT - PROBLEM SELECTOR PLAN 2
Blood c/s shows GPC in clusters- CNS, may be contaminant  - WBC wnl, afebrile- no sepsis  - repeat blood c/s done today

## 2023-09-03 NOTE — DIETITIAN INITIAL EVALUATION ADULT - NS FNS DIET ORDER
Diet, Soft and Bite Sized:   Supplement Feeding Modality:  Oral  Ensure Enlive Cans or Servings Per Day:  2       Frequency:  Two Times a day (09-03-23 @ 09:55)

## 2023-09-03 NOTE — DIETITIAN INITIAL EVALUATION ADULT - OTHER INFO
Home Medications:  amitriptyline 10 mg oral tablet: 1 tab(s) orally once a day (01 Sep 2023 09:32)  Benicar 20 mg oral tablet: 1 tab(s) orally once a day (01 Sep 2023 09:32)  Dexilant 60 mg oral delayed release capsule: 1 cap(s) orally once a day (01 Sep 2023 09:32)  dicyclomine 20 mg oral tablet: 1 tab(s) orally 4 times a day as needed  Note:Pt has not started yet (01 Sep 2023 09:29)  ergocalciferol 1.25 mg (50,000 intl units) oral capsule: 1 cap(s) orally once a week on fridays (01 Sep 2023 09:32)  Linzess 145 mcg oral capsule: 1 cap(s) orally once a day (01 Sep 2023 09:32)  Lipitor 40 mg oral tablet: 1 tab(s) orally once a day (01 Sep 2023 09:32)  magnesium oxide 400 mg oral tablet: 1 tab(s) orally once a day (01 Sep 2023 09:32)  magnesium sulfate/potassium sulfate/sodium sulfate 1.6 g-3.13 g-17.5 g/177 mL oral liquid: NotePt has not started yet (01 Sep 2023 09:37)  Namzaric 28 mg-10 mg oral capsule, extended release: 1 cap(s) orally once a day (01 Sep 2023 09:32)  Pepcid 40 mg oral tablet: 1 tab(s) orally once a day NotePt has not started yet (01 Sep 2023 09:33)  Rexulti 0.5 mg oral tablet: 1 tab(s) orally once a day NotePt has not started yet (01 Sep 2023 09:33)  sucralfate 1 g oral tablet: 1 tab(s) orally once a day (01 Sep 2023 09:32)  Thera M oral tablet: 1 tab(s) orally once a day (01 Sep 2023 09:32)  Trintellix 20 mg oral tablet: 1 tab(s) orally once a day NotePt has not started yet (01 Sep 2023 09:34)  Zofran 8 mg oral tablet: 1 tab(s) orally every 8 hours as needed  NotePt has not started yet (01 Sep 2023 09:34)

## 2023-09-03 NOTE — PROGRESS NOTE ADULT - PROBLEM SELECTOR PLAN 5
Alk-phos 167, AST 57 and ALT 59  - unclear etiology, now improved with IV hydration  - hepatitis panel, RUQ  US ok

## 2023-09-03 NOTE — DIETITIAN INITIAL EVALUATION ADULT - PERTINENT MEDS FT
MEDICATIONS  (STANDING):  amitriptyline 10 milliGRAM(s) Oral daily  atorvastatin 40 milliGRAM(s) Oral at bedtime  cefTRIAXone   IVPB 1000 milliGRAM(s) IV Intermittent every 24 hours  enoxaparin Injectable 40 milliGRAM(s) SubCutaneous every 24 hours  ferrous    sulfate 325 milliGRAM(s) Oral daily  fluticasone propionate 50 MICROgram(s)/spray Nasal Spray 1 Spray(s) Both Nostrils two times a day  guaiFENesin  milliGRAM(s) Oral every 12 hours  lactobacillus acidophilus 1 Tablet(s) Oral two times a day with meals  loratadine 10 milliGRAM(s) Oral daily  losartan 50 milliGRAM(s) Oral daily  metroNIDAZOLE  IVPB      metroNIDAZOLE  IVPB 500 milliGRAM(s) IV Intermittent every 8 hours  pantoprazole    Tablet 40 milliGRAM(s) Oral before breakfast  triamcinolone 0.1% Cream 1 Application(s) Topical every 8 hours    MEDICATIONS  (PRN):

## 2023-09-03 NOTE — DIETITIAN NUTRITION RISK NOTIFICATION - TREATMENT: THE FOLLOWING DIET HAS BEEN RECOMMENDED
Diet, Soft and Bite Sized:   Supplement Feeding Modality:  Oral  Ensure Enlive Cans or Servings Per Day:  2       Frequency:  Two Times a day (09-03-23 @ 09:55) [Active]

## 2023-09-03 NOTE — DIETITIAN INITIAL EVALUATION ADULT - CONTINUE CURRENT NUTRITION CARE PLAN
- Consider formal swallowing evaluation with speech pathologist in setting of reported swallowing difficulty/gagging; Defer diet/fluid consistencies to medical team/SLP recommendations.

## 2023-09-03 NOTE — DIETITIAN INITIAL EVALUATION ADULT - PERTINENT LABORATORY DATA
09-03    135  |  102  |  13  ----------------------------<  109<H>  3.9   |  19<L>  |  0.83    Ca    9.4      03 Sep 2023 06:43  Phos  2.6     09-01  Mg     1.8     09-01    TPro  6.5  /  Alb  3.1<L>  /  TBili  0.4  /  DBili  x   /  AST  33  /  ALT  42  /  AlkPhos  158<H>  09-03 09-03 @ 06:43: Na 135, BUN 13, Cr 0.83, <H>, K+ 3.9, Phos --, Mg --, Alk Phos 158<H>, ALT/SGPT 42, AST/SGOT 33, HbA1c --

## 2023-09-03 NOTE — DIETITIAN INITIAL EVALUATION ADULT - NSFNSGIASSESSMENTFT_GEN_A_CORE
- Pt denies nausea; reports vomiting yesterday;reports diarrhea 3 days ago  - Last BM:today per pt; not currently ordered for bowel regimen

## 2023-09-03 NOTE — DIETITIAN INITIAL EVALUATION ADULT - NSFNSNUTRCHEWSWALLOWFT_GEN_A_CORE
Consider formal swallowing evaluation with speech pathologist in setting of reported swallowing difficulty/gagging.    Defer diet/fluid consistencies to medical team/SLP recommendations.

## 2023-09-03 NOTE — DIETITIAN INITIAL EVALUATION ADULT - REASON INDICATOR FOR ASSESSMENT
Consult received for assessment and education   Information obtained from pt, electronic medical record  Chart reviewed, events noted

## 2023-09-04 LAB
ALBUMIN SERPL ELPH-MCNC: 3.2 G/DL — LOW (ref 3.3–5)
ALP SERPL-CCNC: 145 U/L — HIGH (ref 40–120)
ALT FLD-CCNC: 36 U/L — SIGNIFICANT CHANGE UP (ref 10–45)
ANION GAP SERPL CALC-SCNC: 16 MMOL/L — SIGNIFICANT CHANGE UP (ref 5–17)
AST SERPL-CCNC: 26 U/L — SIGNIFICANT CHANGE UP (ref 10–40)
BASOPHILS # BLD AUTO: 0.02 K/UL — SIGNIFICANT CHANGE UP (ref 0–0.2)
BASOPHILS NFR BLD AUTO: 0.2 % — SIGNIFICANT CHANGE UP (ref 0–2)
BILIRUB SERPL-MCNC: 0.4 MG/DL — SIGNIFICANT CHANGE UP (ref 0.2–1.2)
BUN SERPL-MCNC: 14 MG/DL — SIGNIFICANT CHANGE UP (ref 7–23)
CALCIUM SERPL-MCNC: 9.6 MG/DL — SIGNIFICANT CHANGE UP (ref 8.4–10.5)
CHLORIDE SERPL-SCNC: 102 MMOL/L — SIGNIFICANT CHANGE UP (ref 96–108)
CO2 SERPL-SCNC: 20 MMOL/L — LOW (ref 22–31)
CREAT SERPL-MCNC: 0.89 MG/DL — SIGNIFICANT CHANGE UP (ref 0.5–1.3)
EGFR: 67 ML/MIN/1.73M2 — SIGNIFICANT CHANGE UP
EOSINOPHIL # BLD AUTO: 0.3 K/UL — SIGNIFICANT CHANGE UP (ref 0–0.5)
EOSINOPHIL NFR BLD AUTO: 3.4 % — SIGNIFICANT CHANGE UP (ref 0–6)
GLUCOSE SERPL-MCNC: 105 MG/DL — HIGH (ref 70–99)
HCT VFR BLD CALC: 30.5 % — LOW (ref 34.5–45)
HGB BLD-MCNC: 9.7 G/DL — LOW (ref 11.5–15.5)
IMM GRANULOCYTES NFR BLD AUTO: 1.5 % — HIGH (ref 0–0.9)
LYMPHOCYTES # BLD AUTO: 0.75 K/UL — LOW (ref 1–3.3)
LYMPHOCYTES # BLD AUTO: 8.4 % — LOW (ref 13–44)
MCHC RBC-ENTMCNC: 24.7 PG — LOW (ref 27–34)
MCHC RBC-ENTMCNC: 31.8 GM/DL — LOW (ref 32–36)
MCV RBC AUTO: 77.6 FL — LOW (ref 80–100)
MONOCYTES # BLD AUTO: 0.71 K/UL — SIGNIFICANT CHANGE UP (ref 0–0.9)
MONOCYTES NFR BLD AUTO: 7.9 % — SIGNIFICANT CHANGE UP (ref 2–14)
NEUTROPHILS # BLD AUTO: 7.04 K/UL — SIGNIFICANT CHANGE UP (ref 1.8–7.4)
NEUTROPHILS NFR BLD AUTO: 78.6 % — HIGH (ref 43–77)
NRBC # BLD: 0 /100 WBCS — SIGNIFICANT CHANGE UP (ref 0–0)
PLATELET # BLD AUTO: 295 K/UL — SIGNIFICANT CHANGE UP (ref 150–400)
POTASSIUM SERPL-MCNC: 3.9 MMOL/L — SIGNIFICANT CHANGE UP (ref 3.5–5.3)
POTASSIUM SERPL-SCNC: 3.9 MMOL/L — SIGNIFICANT CHANGE UP (ref 3.5–5.3)
PROT SERPL-MCNC: 6.5 G/DL — SIGNIFICANT CHANGE UP (ref 6–8.3)
RBC # BLD: 3.93 M/UL — SIGNIFICANT CHANGE UP (ref 3.8–5.2)
RBC # FLD: 14.6 % — HIGH (ref 10.3–14.5)
SODIUM SERPL-SCNC: 138 MMOL/L — SIGNIFICANT CHANGE UP (ref 135–145)
WBC # BLD: 8.95 K/UL — SIGNIFICANT CHANGE UP (ref 3.8–10.5)
WBC # FLD AUTO: 8.95 K/UL — SIGNIFICANT CHANGE UP (ref 3.8–10.5)

## 2023-09-04 PROCEDURE — 99232 SBSQ HOSP IP/OBS MODERATE 35: CPT | Mod: GC

## 2023-09-04 PROCEDURE — 99233 SBSQ HOSP IP/OBS HIGH 50: CPT | Mod: FS

## 2023-09-04 RX ORDER — ALPRAZOLAM 0.25 MG
0.25 TABLET ORAL ONCE
Refills: 0 | Status: DISCONTINUED | OUTPATIENT
Start: 2023-09-04 | End: 2023-09-04

## 2023-09-04 RX ORDER — ACETAMINOPHEN 500 MG
650 TABLET ORAL EVERY 6 HOURS
Refills: 0 | Status: DISCONTINUED | OUTPATIENT
Start: 2023-09-04 | End: 2023-09-09

## 2023-09-04 RX ORDER — ONDANSETRON 8 MG/1
4 TABLET, FILM COATED ORAL ONCE
Refills: 0 | Status: COMPLETED | OUTPATIENT
Start: 2023-09-04 | End: 2023-09-09

## 2023-09-04 RX ADMIN — Medication 0.25 MILLIGRAM(S): at 21:09

## 2023-09-04 RX ADMIN — Medication 325 MILLIGRAM(S): at 11:17

## 2023-09-04 RX ADMIN — Medication 100 MILLIGRAM(S): at 05:45

## 2023-09-04 RX ADMIN — Medication 1 APPLICATION(S): at 05:45

## 2023-09-04 RX ADMIN — Medication 1 TABLET(S): at 09:13

## 2023-09-04 RX ADMIN — Medication 100 MILLIGRAM(S): at 13:37

## 2023-09-04 RX ADMIN — Medication 650 MILLIGRAM(S): at 09:12

## 2023-09-04 RX ADMIN — Medication 1 SPRAY(S): at 05:46

## 2023-09-04 RX ADMIN — Medication 1 APPLICATION(S): at 22:01

## 2023-09-04 RX ADMIN — PANTOPRAZOLE SODIUM 40 MILLIGRAM(S): 20 TABLET, DELAYED RELEASE ORAL at 05:46

## 2023-09-04 RX ADMIN — LORATADINE 10 MILLIGRAM(S): 10 TABLET ORAL at 11:17

## 2023-09-04 RX ADMIN — Medication 600 MILLIGRAM(S): at 05:48

## 2023-09-04 RX ADMIN — Medication 1 SPRAY(S): at 17:01

## 2023-09-04 RX ADMIN — Medication 10 MILLIGRAM(S): at 11:17

## 2023-09-04 RX ADMIN — Medication 100 MILLIGRAM(S): at 21:09

## 2023-09-04 RX ADMIN — ENOXAPARIN SODIUM 40 MILLIGRAM(S): 100 INJECTION SUBCUTANEOUS at 11:17

## 2023-09-04 RX ADMIN — Medication 650 MILLIGRAM(S): at 09:43

## 2023-09-04 RX ADMIN — Medication 600 MILLIGRAM(S): at 17:01

## 2023-09-04 RX ADMIN — Medication 1 TABLET(S): at 17:01

## 2023-09-04 RX ADMIN — ATORVASTATIN CALCIUM 40 MILLIGRAM(S): 80 TABLET, FILM COATED ORAL at 21:09

## 2023-09-04 RX ADMIN — CEFTRIAXONE 100 MILLIGRAM(S): 500 INJECTION, POWDER, FOR SOLUTION INTRAMUSCULAR; INTRAVENOUS at 11:55

## 2023-09-04 RX ADMIN — Medication 1 APPLICATION(S): at 13:35

## 2023-09-04 NOTE — PROGRESS NOTE ADULT - ASSESSMENT
Patient is a 77 yo female w/ hx of HTN, HLD, Depression, Anxiety, ?Dementia who is being admitted for nausea/vomiting, and an inability to tolerate any PO intake over the last 2 days.     # Nausea/ vomiting   - Difficulty with swallowing, epigastric pain and weight loss   - patient reports she had a normal EGD 2 years ago  # Diarrhea  - patient reports 2 episodes of non-bloody watery diarrhea over the last 5 days which has since resolved   - CT showed wall thickening versus underdistention in the partially visualized distal transverse colon and proximal descending colon- follow up imaging showing Stomach is incompletely distended. No focal gastric mass. No gastric   outlet obstruction. Descending sigmoid portions the colon not well distended. No maura   colitis. No diverticulitis.  - patient is afebrile and abdominal exam with minimal abdominal tenderness    Recommendations:  - will plan for EGD tomorrow given patients persistent symptoms of epigastric pain and alram signs of weight loss  - given unintentional weight loss and anemia, patient will need an outpatient colon cancer screening colonoscopy  - please make NPO at midnight   - obtain labs at 4AM and correct electrolytes     Note not finalized until signed by attending     Sury Mondragon MD  Gastroenterology/Hepatology Fellow, PGY-4  Please contact via TEAMS    NON-URGENT CONSULTS:  Please email [email or web address]  [email or web address]  AT NIGHT AND ON WEEKENDS:  Contact on-call GI fellow via answering service ([phone]) from 5pm-8am and on weekends/holidays  MONDAY-FRIDAY 8AM-5PM:  Pager# [phone]   Patient is a 77 yo female w/ hx of HTN, HLD, Depression, Anxiety, ?Dementia who is being admitted for nausea/vomiting, and an inability to tolerate any PO intake over the last 2 days.     # Nausea/ vomiting   - Difficulty with swallowing, epigastric pain and weight loss   - patient reports she had a normal EGD 2 years ago  # Diarrhea  - patient reports 2 episodes of non-bloody watery diarrhea over the last 5 days which has since resolved   - CT showed wall thickening versus underdistention in the partially visualized distal transverse colon and proximal descending colon- follow up imaging showing Stomach is incompletely distended. No focal gastric mass. No gastric   outlet obstruction. Descending sigmoid portions the colon not well distended. No maura   colitis. No diverticulitis.  - patient is afebrile and abdominal exam with minimal abdominal tenderness    Recommendations:  - will plan for EGD tomorrow given patients persistent symptoms of epigastric pain and alarm signs of weight loss  - given unintentional weight loss and anemia, patient will need an outpatient colon cancer screening colonoscopy  - please make NPO at midnight   - obtain labs at 4AM and correct electrolytes     Note not finalized until signed by attending     Sury Mondragon MD  Gastroenterology/Hepatology Fellow, PGY-4  Please contact via TEAMS    NON-URGENT CONSULTS:  Please email [email or web address]  [email or web address]  AT NIGHT AND ON WEEKENDS:  Contact on-call GI fellow via answering service ([phone]) from 5pm-8am and on weekends/holidays  MONDAY-FRIDAY 8AM-5PM:  Pager# [phone]

## 2023-09-04 NOTE — SWALLOW BEDSIDE ASSESSMENT ADULT - COMMENTS
GI: CT showed wall thickening versus underdistention in the partially visualized distal transverse colon and proximal descending colon- follow up imaging showing Stomach is incompletely distended. No focal gastric mass. No gastric outlet obstruction. Descending sigmoid portions the colon not well distended. No maura colitis. No diverticulitis.  GI plan-->clears today, NPO p MN for EGD in am 9/5  Concerning for also possible occult malignancy given age, weight loss and early satiety

## 2023-09-04 NOTE — PROGRESS NOTE ADULT - PROBLEM SELECTOR PLAN 2
Possible contaminant as blood c/s shows CoNS  - WBC wnl, afebrile- no sepsis  - repeat blood c/s neg

## 2023-09-04 NOTE — PROGRESS NOTE ADULT - SUBJECTIVE AND OBJECTIVE BOX
Chief Complaint:  Patient is a 78y old  Female who presents with a chief complaint of nausea, vomiting (04 Sep 2023 09:18)      Interval Events:   - tolerating some food but reports still having some issues with swallowing  - she continues to have epigastric pain   - has not had any vomiting since Friday   - regular BM's, no blood       Allergies:  No Known Allergies        Hospital Medications:  acetaminophen     Tablet .. 650 milliGRAM(s) Oral every 6 hours PRN  amitriptyline 10 milliGRAM(s) Oral daily  atorvastatin 40 milliGRAM(s) Oral at bedtime  cefTRIAXone   IVPB 1000 milliGRAM(s) IV Intermittent every 24 hours  enoxaparin Injectable 40 milliGRAM(s) SubCutaneous every 24 hours  ferrous    sulfate 325 milliGRAM(s) Oral daily  fluticasone propionate 50 MICROgram(s)/spray Nasal Spray 1 Spray(s) Both Nostrils two times a day  guaiFENesin  milliGRAM(s) Oral every 12 hours  lactobacillus acidophilus 1 Tablet(s) Oral two times a day with meals  loratadine 10 milliGRAM(s) Oral daily  losartan 50 milliGRAM(s) Oral daily  metroNIDAZOLE  IVPB 500 milliGRAM(s) IV Intermittent every 8 hours  metroNIDAZOLE  IVPB      pantoprazole    Tablet 40 milliGRAM(s) Oral before breakfast  triamcinolone 0.1% Cream 1 Application(s) Topical every 8 hours      PMHX/PSHX:  HLD (hyperlipidemia)    Anxiety    Depression    H/O prior ablation treatment        Family history:  No pertinent family history in first degree relatives          PHYSICAL EXAM:   Vital Signs:  Vital Signs Last 24 Hrs  T(C): 37.1 (04 Sep 2023 08:10), Max: 37.6 (03 Sep 2023 15:43)  T(F): 98.7 (04 Sep 2023 08:10), Max: 99.7 (03 Sep 2023 15:43)  HR: 76 (04 Sep 2023 08:10) (73 - 87)  BP: 101/65 (04 Sep 2023 08:10) (91/60 - 117/71)  BP(mean): --  RR: 18 (04 Sep 2023 08:10) (18 - 18)  SpO2: 98% (04 Sep 2023 08:10) (97% - 99%)    Parameters below as of 04 Sep 2023 08:10  Patient On (Oxygen Delivery Method): room air      Daily     Daily     GENERAL:  NAD  HEENT:  NC/AT, sclera -anicteric  CHEST:  Full & symmetric excursion, no increased effort, breath sounds clear  HEART:  Regular rhythm, S1, S2, no murmur/rub/S3/S4, no abdominal bruit  ABDOMEN:  Soft, non-tender, non-distended, normoactive bowel sounds  EXTEREMITIES:  no cyanosis,clubbing  SKIN:  No rash/erythema  NEURO:  Alert, oriented, no asterixis, no tremor    LABS:                        9.7    8.95  )-----------( 295      ( 04 Sep 2023 07:08 )             30.5     Mean Cell Volume: 77.6 fl (09-04-23 @ 07:08)    09-04    138  |  102  |  14  ----------------------------<  105<H>  3.9   |  20<L>  |  0.89    Ca    9.6      04 Sep 2023 07:09    TPro  6.5  /  Alb  3.2<L>  /  TBili  0.4  /  DBili  x   /  AST  26  /  ALT  36  /  AlkPhos  145<H>  09-04    LIVER FUNCTIONS - ( 04 Sep 2023 07:09 )  Alb: 3.2 g/dL / Pro: 6.5 g/dL / ALK PHOS: 145 U/L / ALT: 36 U/L / AST: 26 U/L / GGT: x             Urinalysis Basic - ( 04 Sep 2023 07:09 )    Color: x / Appearance: x / SG: x / pH: x  Gluc: 105 mg/dL / Ketone: x  / Bili: x / Urobili: x   Blood: x / Protein: x / Nitrite: x   Leuk Esterase: x / RBC: x / WBC x   Sq Epi: x / Non Sq Epi: x / Bacteria: x                              9.7    8.95  )-----------( 295      ( 04 Sep 2023 07:08 )             30.5                         9.5    8.88  )-----------( 254      ( 03 Sep 2023 06:43 )             29.0                         9.8    8.03  )-----------( 228      ( 01 Sep 2023 10:33 )             30.8

## 2023-09-04 NOTE — PROGRESS NOTE ADULT - SUBJECTIVE AND OBJECTIVE BOX
Mohsin Khan, MD  Attending Physician, Division Of Hospital Medicine  Pager: [phone], Office: [phone]  Off hour pager: [phone]    Patient is a 78y old  Female who presents with a chief complaint of Nausea and vomiting      SUBJECTIVE / OVERNIGHT EVENTS:  Seen, examined the patient this am  Resting in bed, c/o dry throat, had some food, no diarrhea, mild HA, less neck pain, no fever, had BM, VSS    MEDICATIONS  (STANDING):  amitriptyline 10 milliGRAM(s) Oral daily  atorvastatin 40 milliGRAM(s) Oral at bedtime  cefTRIAXone   IVPB 1000 milliGRAM(s) IV Intermittent every 24 hours  enoxaparin Injectable 40 milliGRAM(s) SubCutaneous every 24 hours  ferrous    sulfate 325 milliGRAM(s) Oral daily  fluticasone propionate 50 MICROgram(s)/spray Nasal Spray 1 Spray(s) Both Nostrils two times a day  guaiFENesin  milliGRAM(s) Oral every 12 hours  lactobacillus acidophilus 1 Tablet(s) Oral two times a day with meals  loratadine 10 milliGRAM(s) Oral daily  losartan 50 milliGRAM(s) Oral daily  metroNIDAZOLE  IVPB 500 milliGRAM(s) IV Intermittent every 8 hours  metroNIDAZOLE  IVPB      pantoprazole    Tablet 40 milliGRAM(s) Oral before breakfast  triamcinolone 0.1% Cream 1 Application(s) Topical every 8 hours    MEDICATIONS  (PRN):  acetaminophen     Tablet .. 650 milliGRAM(s) Oral every 6 hours PRN Temp greater or equal to 38C (100.4F), Mild Pain (1 - 3), Moderate Pain (4 - 6)      Vital Signs Last 24 Hrs  T(C): 37.1 (04 Sep 2023 08:10), Max: 37.6 (03 Sep 2023 15:43)  T(F): 98.7 (04 Sep 2023 08:10), Max: 99.7 (03 Sep 2023 15:43)  HR: 76 (04 Sep 2023 08:10) (73 - 93)  BP: 101/65 (04 Sep 2023 08:10) (91/60 - 117/71)  BP(mean): --  RR: 18 (04 Sep 2023 08:10) (18 - 18)  SpO2: 98% (04 Sep 2023 08:10) (97% - 99%)    Parameters below as of 04 Sep 2023 08:10  Patient On (Oxygen Delivery Method): room air      CAPILLARY BLOOD GLUCOSE        I&O's Summary    03 Sep 2023 07:01  -  04 Sep 2023 07:00  --------------------------------------------------------  IN: 1230 mL / OUT: 1 mL / NET: 1229 mL        PHYSICAL EXAM:-  GENERAL: NAD, well-developed  EYES: EOMI, PERRLA, conjunctiva and sclera clear  NECK: Supple, No JVD, no thyromegaly  CHEST/LUNG: Clear to auscultation bilaterally; No wheeze  HEART: Regular rate and rhythm; S1, S2 audible, No murmurs, rubs, or gallops  ABDOMEN: Soft, Nontender, Nondistended; Bowel sounds present  EXTREMITIES:  2+ Peripheral Pulses, No clubbing, cyanosis, or edema  NEURO: AAOx3, no focal deficit      LABS:                        9.7    8.95  )-----------( 295      ( 04 Sep 2023 07:08 )             30.5     09-04    138  |  102  |  14  ----------------------------<  105<H>  3.9   |  20<L>  |  0.89    Ca    9.6      04 Sep 2023 07:09    TPro  6.5  /  Alb  3.2<L>  /  TBili  0.4  /  DBili  x   /  AST  26  /  ALT  36  /  AlkPhos  145<H>  09-04          Urinalysis Basic - ( 04 Sep 2023 07:09 )    Color: x / Appearance: x / SG: x / pH: x  Gluc: 105 mg/dL / Ketone: x  / Bili: x / Urobili: x   Blood: x / Protein: x / Nitrite: x   Leuk Esterase: x / RBC: x / WBC x   Sq Epi: x / Non Sq Epi: x / Bacteria: x        RADIOLOGY & ADDITIONAL TESTS:    Imaging Personally Reviewed: CT abd, CXR, CTH  Consultant(s) Notes Reviewed: GI

## 2023-09-05 ENCOUNTER — RESULT REVIEW (OUTPATIENT)
Age: 78
End: 2023-09-05

## 2023-09-05 LAB
ANION GAP SERPL CALC-SCNC: 12 MMOL/L — SIGNIFICANT CHANGE UP (ref 5–17)
APTT BLD: 32.3 SEC — SIGNIFICANT CHANGE UP (ref 24.5–35.6)
BUN SERPL-MCNC: 12 MG/DL — SIGNIFICANT CHANGE UP (ref 7–23)
CALCIUM SERPL-MCNC: 9.2 MG/DL — SIGNIFICANT CHANGE UP (ref 8.4–10.5)
CHLORIDE SERPL-SCNC: 104 MMOL/L — SIGNIFICANT CHANGE UP (ref 96–108)
CO2 SERPL-SCNC: 21 MMOL/L — LOW (ref 22–31)
CREAT SERPL-MCNC: 0.78 MG/DL — SIGNIFICANT CHANGE UP (ref 0.5–1.3)
CULTURE RESULTS: SIGNIFICANT CHANGE UP
EGFR: 79 ML/MIN/1.73M2 — SIGNIFICANT CHANGE UP
GLUCOSE SERPL-MCNC: 114 MG/DL — HIGH (ref 70–99)
HCT VFR BLD CALC: 28.6 % — LOW (ref 34.5–45)
HGB BLD-MCNC: 9.3 G/DL — LOW (ref 11.5–15.5)
INR BLD: 1.47 RATIO — HIGH (ref 0.85–1.18)
MCHC RBC-ENTMCNC: 24.5 PG — LOW (ref 27–34)
MCHC RBC-ENTMCNC: 32.5 GM/DL — SIGNIFICANT CHANGE UP (ref 32–36)
MCV RBC AUTO: 75.5 FL — LOW (ref 80–100)
NRBC # BLD: 0 /100 WBCS — SIGNIFICANT CHANGE UP (ref 0–0)
PLATELET # BLD AUTO: 279 K/UL — SIGNIFICANT CHANGE UP (ref 150–400)
POTASSIUM SERPL-MCNC: 4 MMOL/L — SIGNIFICANT CHANGE UP (ref 3.5–5.3)
POTASSIUM SERPL-SCNC: 4 MMOL/L — SIGNIFICANT CHANGE UP (ref 3.5–5.3)
PROTHROM AB SERPL-ACNC: 15.3 SEC — HIGH (ref 9.5–13)
RBC # BLD: 3.79 M/UL — LOW (ref 3.8–5.2)
RBC # FLD: 14.5 % — SIGNIFICANT CHANGE UP (ref 10.3–14.5)
SODIUM SERPL-SCNC: 137 MMOL/L — SIGNIFICANT CHANGE UP (ref 135–145)
SPECIMEN SOURCE: SIGNIFICANT CHANGE UP
WBC # BLD: 8.42 K/UL — SIGNIFICANT CHANGE UP (ref 3.8–10.5)
WBC # FLD AUTO: 8.42 K/UL — SIGNIFICANT CHANGE UP (ref 3.8–10.5)

## 2023-09-05 PROCEDURE — 99233 SBSQ HOSP IP/OBS HIGH 50: CPT

## 2023-09-05 PROCEDURE — 88342 IMHCHEM/IMCYTCHM 1ST ANTB: CPT | Mod: 26

## 2023-09-05 PROCEDURE — 43239 EGD BIOPSY SINGLE/MULTIPLE: CPT | Mod: GC

## 2023-09-05 PROCEDURE — 88305 TISSUE EXAM BY PATHOLOGIST: CPT | Mod: 26

## 2023-09-05 RX ORDER — LIDOCAINE HCL 20 MG/ML
4 VIAL (ML) INJECTION ONCE
Refills: 0 | Status: COMPLETED | OUTPATIENT
Start: 2023-09-05 | End: 2023-09-05

## 2023-09-05 RX ADMIN — LORATADINE 10 MILLIGRAM(S): 10 TABLET ORAL at 17:12

## 2023-09-05 RX ADMIN — Medication 100 MILLIGRAM(S): at 06:43

## 2023-09-05 RX ADMIN — Medication 10 MILLIGRAM(S): at 17:12

## 2023-09-05 RX ADMIN — CEFTRIAXONE 100 MILLIGRAM(S): 500 INJECTION, POWDER, FOR SOLUTION INTRAMUSCULAR; INTRAVENOUS at 11:21

## 2023-09-05 RX ADMIN — LOSARTAN POTASSIUM 50 MILLIGRAM(S): 100 TABLET, FILM COATED ORAL at 06:42

## 2023-09-05 RX ADMIN — Medication 1 APPLICATION(S): at 06:01

## 2023-09-05 RX ADMIN — Medication 1 APPLICATION(S): at 16:42

## 2023-09-05 RX ADMIN — Medication 600 MILLIGRAM(S): at 17:12

## 2023-09-05 RX ADMIN — Medication 1 SPRAY(S): at 17:12

## 2023-09-05 RX ADMIN — ATORVASTATIN CALCIUM 40 MILLIGRAM(S): 80 TABLET, FILM COATED ORAL at 22:47

## 2023-09-05 RX ADMIN — Medication 600 MILLIGRAM(S): at 06:42

## 2023-09-05 RX ADMIN — Medication 4 MILLILITER(S): at 12:55

## 2023-09-05 RX ADMIN — ENOXAPARIN SODIUM 40 MILLIGRAM(S): 100 INJECTION SUBCUTANEOUS at 17:13

## 2023-09-05 RX ADMIN — Medication 100 MILLIGRAM(S): at 22:46

## 2023-09-05 RX ADMIN — Medication 1 SPRAY(S): at 06:43

## 2023-09-05 RX ADMIN — Medication 1 APPLICATION(S): at 22:39

## 2023-09-05 RX ADMIN — Medication 325 MILLIGRAM(S): at 17:12

## 2023-09-05 RX ADMIN — Medication 1 TABLET(S): at 17:12

## 2023-09-05 RX ADMIN — Medication 100 MILLIGRAM(S): at 16:43

## 2023-09-05 RX ADMIN — PANTOPRAZOLE SODIUM 40 MILLIGRAM(S): 20 TABLET, DELAYED RELEASE ORAL at 06:42

## 2023-09-05 NOTE — PROGRESS NOTE ADULT - PROBLEM SELECTOR PLAN 1
- CT also showing wall thickening of colon, concerning for colitis, states she had chills but does not have any documented fevers, Blood c/s shows GPC in clusters- possible contaminant- coag negative staph   - c/w IV ceftriaxone and flagyl for now, can discharge on total 10 day po course   - stool c/s and GI pcr ordered  - f/u final GI recs

## 2023-09-05 NOTE — PROGRESS NOTE ADULT - PROBLEM SELECTOR PLAN 2
- Possible contaminant as blood c/s shows CoNS  - WBC wnl, afebrile- no sepsis  - repeat blood c/s neg

## 2023-09-05 NOTE — PRE PROCEDURE NOTE - PRE PROCEDURE EVALUATION
Pre-Endoscopy Evaluation    Attending Physician:  Dr. Orozco    Procedure: EGD    Indication for Procedure & Pertinent History: See [employer] chart    PAST MEDICAL & SURGICAL HISTORY:  HLD (hyperlipidemia)      Anxiety      Depression      H/O prior ablation treatment          Allergies    No Known Allergies    Intolerances        Medications: MEDICATIONS  (STANDING):  amitriptyline 10 milliGRAM(s) Oral daily  atorvastatin 40 milliGRAM(s) Oral at bedtime  cefTRIAXone   IVPB 1000 milliGRAM(s) IV Intermittent every 24 hours  enoxaparin Injectable 40 milliGRAM(s) SubCutaneous every 24 hours  ferrous    sulfate 325 milliGRAM(s) Oral daily  fluticasone propionate 50 MICROgram(s)/spray Nasal Spray 1 Spray(s) Both Nostrils two times a day  guaiFENesin  milliGRAM(s) Oral every 12 hours  lactobacillus acidophilus 1 Tablet(s) Oral two times a day with meals  loratadine 10 milliGRAM(s) Oral daily  losartan 50 milliGRAM(s) Oral daily  metroNIDAZOLE  IVPB      metroNIDAZOLE  IVPB 500 milliGRAM(s) IV Intermittent every 8 hours  ondansetron Injectable 4 milliGRAM(s) IV Push once  pantoprazole    Tablet 40 milliGRAM(s) Oral before breakfast  triamcinolone 0.1% Cream 1 Application(s) Topical every 8 hours    MEDICATIONS  (PRN):  acetaminophen     Tablet .. 650 milliGRAM(s) Oral every 6 hours PRN Temp greater or equal to 38C (100.4F), Mild Pain (1 - 3), Moderate Pain (4 - 6)      Pertinent lab data:                        9.3    8.42  )-----------( 279      ( 05 Sep 2023 05:00 )             28.6     09-05    137  |  104  |  12  ----------------------------<  114<H>  4.0   |  21<L>  |  0.78    Ca    9.2      05 Sep 2023 05:01    TPro  6.5  /  Alb  3.2<L>  /  TBili  0.4  /  DBili  x   /  AST  26  /  ALT  36  /  AlkPhos  145<H>  09-04    PT/INR - ( 05 Sep 2023 05:01 )   PT: 15.3 sec;   INR: 1.47 ratio         PTT - ( 05 Sep 2023 05:01 )  PTT:32.3 sec            Physical Examination:  See Physical Exam in H&P and/or Progress notes.    Comments:    ASA Class: I []  II [X]  III []  IV []    The patient is a suitable candidate for the planned procedure unless box checked [ ]  No, explain:

## 2023-09-05 NOTE — PROGRESS NOTE ADULT - PROBLEM SELECTOR PLAN 8
Diet: advanced soft diet, ensure  Dvt PPx: Lovenox 40 qD  Dispo: home PT     D/w daughter 9/5 updated on full plan of care

## 2023-09-05 NOTE — PROGRESS NOTE ADULT - SUBJECTIVE AND OBJECTIVE BOX
Patient is a 78y old  Female who presents with a chief complaint of nausea, vomiting (04 Sep 2023 10:28)      SUBJECTIVE / OVERNIGHT EVENTS: daughter at bedside, pt feels as if something is getting stuick in her throat, difficulty swallowing, denies cp, sob     MEDICATIONS  (STANDING):  amitriptyline 10 milliGRAM(s) Oral daily  atorvastatin 40 milliGRAM(s) Oral at bedtime  cefTRIAXone   IVPB 1000 milliGRAM(s) IV Intermittent every 24 hours  enoxaparin Injectable 40 milliGRAM(s) SubCutaneous every 24 hours  ferrous    sulfate 325 milliGRAM(s) Oral daily  fluticasone propionate 50 MICROgram(s)/spray Nasal Spray 1 Spray(s) Both Nostrils two times a day  guaiFENesin  milliGRAM(s) Oral every 12 hours  lactobacillus acidophilus 1 Tablet(s) Oral two times a day with meals  lidocaine 4% Injection for Nebulization 4 milliLiter(s) Nebulizer once  loratadine 10 milliGRAM(s) Oral daily  losartan 50 milliGRAM(s) Oral daily  metroNIDAZOLE  IVPB      metroNIDAZOLE  IVPB 500 milliGRAM(s) IV Intermittent every 8 hours  ondansetron Injectable 4 milliGRAM(s) IV Push once  pantoprazole    Tablet 40 milliGRAM(s) Oral before breakfast  triamcinolone 0.1% Cream 1 Application(s) Topical every 8 hours    MEDICATIONS  (PRN):  acetaminophen     Tablet .. 650 milliGRAM(s) Oral every 6 hours PRN Temp greater or equal to 38C (100.4F), Mild Pain (1 - 3), Moderate Pain (4 - 6)  aluminum hydroxide/magnesium hydroxide/simethicone Suspension 30 milliLiter(s) Oral every 6 hours PRN Dyspepsia        CAPILLARY BLOOD GLUCOSE        I&O's Summary    04 Sep 2023 07:01  -  05 Sep 2023 07:00  --------------------------------------------------------  IN: 1980 mL / OUT: 0 mL / NET: 1980 mL    05 Sep 2023 07:01  -  05 Sep 2023 13:14  --------------------------------------------------------  IN: 50 mL / OUT: 0 mL / NET: 50 mL        PHYSICAL EXAM:  GENERAL: NAD, well-developed +oral thrush   HEAD:  Atraumatic, Normocephalic  EYES: conjunctiva and sclera clear  NECK: No JVD  CHEST/LUNG: Clear to auscultation bilaterally; No wheeze  HEART: Regular rate and rhythm; S1S2  ABDOMEN: Soft, Nontender, Nondistended; Bowel sounds present  EXTREMITIES:  2+ Peripheral Pulses, No clubbing, cyanosis, or edema  PSYCH: AAOx2*3  NEUROLOGY: non-focal  SKIN: No rashes or lesions    LABS:                        9.3    8.42  )-----------( 279      ( 05 Sep 2023 05:00 )             28.6     09-05    137  |  104  |  12  ----------------------------<  114<H>  4.0   |  21<L>  |  0.78    Ca    9.2      05 Sep 2023 05:01    TPro  6.5  /  Alb  3.2<L>  /  TBili  0.4  /  DBili  x   /  AST  26  /  ALT  36  /  AlkPhos  145<H>  09-04    PT/INR - ( 05 Sep 2023 05:01 )   PT: 15.3 sec;   INR: 1.47 ratio         PTT - ( 05 Sep 2023 05:01 )  PTT:32.3 sec      Urinalysis Basic - ( 05 Sep 2023 05:01 )    Color: x / Appearance: x / SG: x / pH: x  Gluc: 114 mg/dL / Ketone: x  / Bili: x / Urobili: x   Blood: x / Protein: x / Nitrite: x   Leuk Esterase: x / RBC: x / WBC x   Sq Epi: x / Non Sq Epi: x / Bacteria: x        RADIOLOGY & ADDITIONAL TESTS:    Imaging Personally Reviewed:    Consultant(s) Notes Reviewed:      Care Discussed with Consultants/Other Providers:

## 2023-09-05 NOTE — PROGRESS NOTE ADULT - PROBLEM SELECTOR PLAN 5
- Alk-phos 167, AST 57 and ALT 59  - unclear etiology, now improved with IV hydration  - hepatitis panel, RUQ  US ok

## 2023-09-06 DIAGNOSIS — R13.10 DYSPHAGIA, UNSPECIFIED: ICD-10-CM

## 2023-09-06 LAB
ANION GAP SERPL CALC-SCNC: 13 MMOL/L — SIGNIFICANT CHANGE UP (ref 5–17)
APTT BLD: 32.7 SEC — SIGNIFICANT CHANGE UP (ref 24.5–35.6)
BUN SERPL-MCNC: 14 MG/DL — SIGNIFICANT CHANGE UP (ref 7–23)
CALCIUM SERPL-MCNC: 9.3 MG/DL — SIGNIFICANT CHANGE UP (ref 8.4–10.5)
CHLORIDE SERPL-SCNC: 105 MMOL/L — SIGNIFICANT CHANGE UP (ref 96–108)
CO2 SERPL-SCNC: 18 MMOL/L — LOW (ref 22–31)
CREAT SERPL-MCNC: 0.71 MG/DL — SIGNIFICANT CHANGE UP (ref 0.5–1.3)
CULTURE RESULTS: SIGNIFICANT CHANGE UP
EGFR: 88 ML/MIN/1.73M2 — SIGNIFICANT CHANGE UP
GLUCOSE SERPL-MCNC: 98 MG/DL — SIGNIFICANT CHANGE UP (ref 70–99)
HCT VFR BLD CALC: 26.9 % — LOW (ref 34.5–45)
HGB BLD-MCNC: 8.7 G/DL — LOW (ref 11.5–15.5)
INR BLD: 1.36 RATIO — HIGH (ref 0.85–1.18)
MAGNESIUM SERPL-MCNC: 1.7 MG/DL — SIGNIFICANT CHANGE UP (ref 1.6–2.6)
MCHC RBC-ENTMCNC: 24.6 PG — LOW (ref 27–34)
MCHC RBC-ENTMCNC: 32.3 GM/DL — SIGNIFICANT CHANGE UP (ref 32–36)
MCV RBC AUTO: 76 FL — LOW (ref 80–100)
NRBC # BLD: 0 /100 WBCS — SIGNIFICANT CHANGE UP (ref 0–0)
PLATELET # BLD AUTO: 298 K/UL — SIGNIFICANT CHANGE UP (ref 150–400)
POTASSIUM SERPL-MCNC: 4 MMOL/L — SIGNIFICANT CHANGE UP (ref 3.5–5.3)
POTASSIUM SERPL-SCNC: 4 MMOL/L — SIGNIFICANT CHANGE UP (ref 3.5–5.3)
PROTHROM AB SERPL-ACNC: 14.8 SEC — HIGH (ref 9.5–13)
RBC # BLD: 3.54 M/UL — LOW (ref 3.8–5.2)
RBC # FLD: 14.6 % — HIGH (ref 10.3–14.5)
SARS-COV-2 RNA SPEC QL NAA+PROBE: SIGNIFICANT CHANGE UP
SODIUM SERPL-SCNC: 136 MMOL/L — SIGNIFICANT CHANGE UP (ref 135–145)
SPECIMEN SOURCE: SIGNIFICANT CHANGE UP
WBC # BLD: 8.98 K/UL — SIGNIFICANT CHANGE UP (ref 3.8–10.5)
WBC # FLD AUTO: 8.98 K/UL — SIGNIFICANT CHANGE UP (ref 3.8–10.5)

## 2023-09-06 PROCEDURE — 99233 SBSQ HOSP IP/OBS HIGH 50: CPT

## 2023-09-06 PROCEDURE — 99223 1ST HOSP IP/OBS HIGH 75: CPT | Mod: FS,25

## 2023-09-06 PROCEDURE — 31575 DIAGNOSTIC LARYNGOSCOPY: CPT

## 2023-09-06 PROCEDURE — 99232 SBSQ HOSP IP/OBS MODERATE 35: CPT | Mod: GC

## 2023-09-06 RX ORDER — NYSTATIN 500MM UNIT
500000 POWDER (EA) MISCELLANEOUS DAILY
Refills: 0 | Status: DISCONTINUED | OUTPATIENT
Start: 2023-09-06 | End: 2023-09-08

## 2023-09-06 RX ORDER — SODIUM CHLORIDE 9 MG/ML
1000 INJECTION, SOLUTION INTRAVENOUS
Refills: 0 | Status: DISCONTINUED | OUTPATIENT
Start: 2023-09-06 | End: 2023-09-07

## 2023-09-06 RX ORDER — LIDOCAINE 4 G/100G
1 CREAM TOPICAL DAILY
Refills: 0 | Status: DISCONTINUED | OUTPATIENT
Start: 2023-09-06 | End: 2023-09-11

## 2023-09-06 RX ADMIN — LOSARTAN POTASSIUM 50 MILLIGRAM(S): 100 TABLET, FILM COATED ORAL at 06:10

## 2023-09-06 RX ADMIN — LORATADINE 10 MILLIGRAM(S): 10 TABLET ORAL at 13:30

## 2023-09-06 RX ADMIN — Medication 10 MILLIGRAM(S): at 13:30

## 2023-09-06 RX ADMIN — Medication 1 SPRAY(S): at 18:17

## 2023-09-06 RX ADMIN — Medication 500000 UNIT(S): at 18:20

## 2023-09-06 RX ADMIN — Medication 1 TABLET(S): at 18:17

## 2023-09-06 RX ADMIN — ATORVASTATIN CALCIUM 40 MILLIGRAM(S): 80 TABLET, FILM COATED ORAL at 23:05

## 2023-09-06 RX ADMIN — Medication 100 MILLIGRAM(S): at 23:05

## 2023-09-06 RX ADMIN — Medication 100 MILLIGRAM(S): at 06:09

## 2023-09-06 RX ADMIN — Medication 1 SPRAY(S): at 06:11

## 2023-09-06 RX ADMIN — LIDOCAINE 1 PATCH: 4 CREAM TOPICAL at 18:21

## 2023-09-06 RX ADMIN — PANTOPRAZOLE SODIUM 40 MILLIGRAM(S): 20 TABLET, DELAYED RELEASE ORAL at 06:09

## 2023-09-06 RX ADMIN — Medication 1 APPLICATION(S): at 06:39

## 2023-09-06 RX ADMIN — CEFTRIAXONE 100 MILLIGRAM(S): 500 INJECTION, POWDER, FOR SOLUTION INTRAMUSCULAR; INTRAVENOUS at 13:28

## 2023-09-06 RX ADMIN — Medication 1 TABLET(S): at 09:18

## 2023-09-06 RX ADMIN — Medication 600 MILLIGRAM(S): at 18:17

## 2023-09-06 RX ADMIN — ENOXAPARIN SODIUM 40 MILLIGRAM(S): 100 INJECTION SUBCUTANEOUS at 13:31

## 2023-09-06 RX ADMIN — Medication 100 MILLIGRAM(S): at 14:18

## 2023-09-06 RX ADMIN — SODIUM CHLORIDE 55 MILLILITER(S): 9 INJECTION, SOLUTION INTRAVENOUS at 17:55

## 2023-09-06 RX ADMIN — Medication 1 APPLICATION(S): at 13:55

## 2023-09-06 RX ADMIN — Medication 325 MILLIGRAM(S): at 13:30

## 2023-09-06 RX ADMIN — Medication 600 MILLIGRAM(S): at 06:09

## 2023-09-06 NOTE — CONSULT NOTE ADULT - PROBLEM SELECTOR RECOMMENDATION 9
- f/u GI recs  - diet per S&S  - no ENT intervention required at this time  - call with questions or concerns x [phone]

## 2023-09-06 NOTE — PROGRESS NOTE ADULT - PROBLEM SELECTOR PLAN 2
- Blood cx coag negative staph   - Likely contaminant   - Repeat blood cx ngtd  - WBC wnl, afebrile- no sepsis

## 2023-09-06 NOTE — PROGRESS NOTE ADULT - PROBLEM SELECTOR PLAN 8
Diet: advanced soft diet, ensure  Dvt PPx: Lovenox 40 qD  Dispo: home PT     D/w daughter 9/6 updated on full plan of care

## 2023-09-06 NOTE — CONSULT NOTE ADULT - SUBJECTIVE AND OBJECTIVE BOX
CC: dysphagia    HPI: 77yo female with PMHx HTN, HLD, Depression, Anxiety, ?Dementia who was admitted for nausea, vomiting, and inability to tolerate PO for 2 days and has decreased appetite for 4-5 months with 10lb weight loss since June. PT is s/p EGD on 9/5 with findings of  Normal esophagus, Hiatal hernia, Erythematous mucosa in the antrum - pending gastric emptying evaluation. ENT called to evaluate for dysphagia. Pt states she is able to swallow and can tolerate soft diet. She denies fever, chills, n/v, HA, SOB, dysphagia, odynophagia, hemoptysis, hoarseness at this time.       PAST MEDICAL & SURGICAL HISTORY:  HLD (hyperlipidemia)      Anxiety      Depression      H/O prior ablation treatment        Allergies    No Known Allergies    Intolerances      MEDICATIONS  (STANDING):  amitriptyline 10 milliGRAM(s) Oral daily  atorvastatin 40 milliGRAM(s) Oral at bedtime  cefTRIAXone   IVPB 1000 milliGRAM(s) IV Intermittent every 24 hours  dextrose 5% + sodium chloride 0.45%. 1000 milliLiter(s) (55 mL/Hr) IV Continuous <Continuous>  enoxaparin Injectable 40 milliGRAM(s) SubCutaneous every 24 hours  ferrous    sulfate 325 milliGRAM(s) Oral daily  fluticasone propionate 50 MICROgram(s)/spray Nasal Spray 1 Spray(s) Both Nostrils two times a day  guaiFENesin  milliGRAM(s) Oral every 12 hours  lactobacillus acidophilus 1 Tablet(s) Oral two times a day with meals  lidocaine   4% Patch 1 Patch Transdermal daily  loratadine 10 milliGRAM(s) Oral daily  losartan 50 milliGRAM(s) Oral daily  metroNIDAZOLE  IVPB      metroNIDAZOLE  IVPB 500 milliGRAM(s) IV Intermittent every 8 hours  nystatin    Suspension 051600 Unit(s) Oral daily  ondansetron Injectable 4 milliGRAM(s) IV Push once  pantoprazole    Tablet 40 milliGRAM(s) Oral before breakfast  triamcinolone 0.1% Cream 1 Application(s) Topical every 8 hours    MEDICATIONS  (PRN):  acetaminophen     Tablet .. 650 milliGRAM(s) Oral every 6 hours PRN Temp greater or equal to 38C (100.4F), Mild Pain (1 - 3), Moderate Pain (4 - 6)  aluminum hydroxide/magnesium hydroxide/simethicone Suspension 30 milliLiter(s) Oral every 6 hours PRN Dyspepsia      Social History: Pt denies tobacco use     Family history: Pt denies any significant family history     ROS:   ENT: all negative except as noted in HPI   CV: denies palpitations  Pulm: denies SOB, cough, hemoptysis  GI: denies change in apetite, indigestion, n/v  : denies pertinent urinary symptoms, urgency  Neuro: denies numbness/tingling, loss of sensation  Psych: denies anxiety  MS: denies muscle weakness, instability  Heme: denies easy bruising or bleeding  Endo: denies heat/cold intolerance, excessive sweating  Vascular: denies LE edema    Vital Signs Last 24 Hrs  T(C): 36.9 (06 Sep 2023 11:42), Max: 37.5 (06 Sep 2023 04:41)  T(F): 98.4 (06 Sep 2023 11:42), Max: 99.5 (06 Sep 2023 04:41)  HR: 94 (06 Sep 2023 11:42) (76 - 94)  BP: 94/67 (06 Sep 2023 12:00) (87/54 - 123/83)  BP(mean): --  RR: 18 (06 Sep 2023 11:42) (18 - 18)  SpO2: 96% (06 Sep 2023 11:42) (96% - 98%)    Parameters below as of 06 Sep 2023 11:42  Patient On (Oxygen Delivery Method): room air                              8.7    8.98  )-----------( 298      ( 06 Sep 2023 06:42 )             26.9    09-06    136  |  105  |  14  ----------------------------<  98  4.0   |  18<L>  |  0.71    Ca    9.3      06 Sep 2023 06:42  Mg     1.7     09-06     PT/INR - ( 06 Sep 2023 06:42 )   PT: 14.8 sec;   INR: 1.36 ratio         PTT - ( 06 Sep 2023 06:42 )  PTT:32.7 sec    PHYSICAL EXAM:  Gen: NAD  Skin: No rashes, bruises, or lesions  Head: Normocephalic, Atraumatic  Face: no edema, erythema, or fluctuance. Parotid glands soft without mass  Eyes: no scleral injection  Nose: Nares bilaterally patent, no discharge  Mouth: No Stridor / Drooling / Trismus.  Mucosa moist, tongue/uvula midline, oropharynx clear  Neck: Flat, supple, no lymphadenopathy, trachea midline, no masses  Lymphatic: No lymphadenopathy  Resp: breathing easily, no stridor  CV: no peripheral edema/cyanosis  GI: nondistended   Peripheral vascular: no JVD or edema  Neuro: facial nerve intact, no facial droop    Fiberoptic Indirect Laryngoscopy (Ambu scope used):    Reason for Laryngoscopy: dysphagia    Patient was unable to cooperate with mirror.  Nasopharynx, oropharynx, and hypopharynx clear, no bleeding. Tongue base, posterior pharyngeal wall, vallecula, epiglottis, and subglottis appear normal. No erythema, edema, pooling of secretions, masses or lesions. Airway patent, no foreign body visualized. No glottic/supraglottic edema. True vocal cords, arytenoids, vestibular folds, ventricles, pyriform sinuses, and aryepiglottic folds appear normal bilaterally. Vocal cords mobile with good contact b/l.

## 2023-09-06 NOTE — PROGRESS NOTE ADULT - PROBLEM SELECTOR PLAN 1
- Patient reports 2 episodes of non-bloody watery diarrhea over the last few days which has since resolved   - CT also showing wall thickening of colon  - Blood c/s shows GPC in clusters, coag negative staph, likely contaminant    - Pt afebrile, no leukocytosis   - C/w IV ceftriaxone and flagyl total 7 day course till 9/7   - GI PCR and stool culture negative

## 2023-09-06 NOTE — PROGRESS NOTE ADULT - ATTENDING COMMENTS
Agree with above. pt poor historian, earlier reported dysphagia but on my exam around 3PM denies dysphagia which she endorsed to nursing staff. No diarrhea, stool today formed. Last CT shows no colonic thickening or abnormality. Will plan for EGD for evaluation of dysphagia tomorrow, recommend outpatient colonoscopy if diarrhea persists and if within goals of care given age >75.
Agree with above. Patient on clarification with daughter at bedside denies ever having dysphagia, she meals more nausea and food hangup. No structural abnormalities seen on EGD, will await gastric emptying study.

## 2023-09-06 NOTE — CONSULT NOTE ADULT - NS ATTEND AMEND GEN_ALL_CORE FT
ENT consulted for Dysphagia    Patient with PMHx HTN, HLD, Depression, Anxiety, ?Dementia who was admitted for nausea, vomiting, and inability to tolerate PO for 2 days and has decreased appetite for 4-5 months with 10lb weight loss since June. PT is s/p EGD on 9/5 with findings of  Normal esophagus, Hiatal hernia, Erythematous mucosa in the antrum - pending gastric emptying evaluation.     Pt states she is able to swallow and can tolerate soft diet. She denies fever, chills, n/v, HA, SOB, dysphagia, odynophagia, hemoptysis, hoarseness at this time.     Indirect laryngoscopy shows bilateral vocal fold mobile and symmetrical. Patent Airway. No gross obstructions    Recommend:  Dysphagia  - f/u GI recs  - diet per S&S  - no ENT intervention required at this time  - call with questions or concerns x [phone]

## 2023-09-06 NOTE — PROGRESS NOTE ADULT - ASSESSMENT
Patient is a 77 yo female w/ hx of HTN, HLD, Depression, Anxiety, ?Dementia who is being admitted for nausea/vomiting, and an inability to tolerate any PO intake over the last 2 days.     # Nausea/ vomiting   - Difficulty with swallowing, epigastric pain and weight loss   - patient reports she had a normal EGD 2 years ago  - s/p EGD on 9/5 and tolerating a liquid diet   - pending gastric emptying study as part of a gastroparesis work up    # Diarrhea  - patient reports 2 episodes of non-bloody watery diarrhea over the last 5 days which has since resolved   - CT showed wall thickening versus underdistention in the partially visualized distal transverse colon and proximal descending colon- follow up imaging showing Stomach is incompletely distended. No focal gastric mass. No gastric   outlet obstruction. Descending sigmoid portions the colon not well distended. No maura   colitis. No diverticulitis.  - patient is afebrile and abdominal exam with minimal abdominal tenderness    Recommendations:   - given unintentional weight loss and anemia, patient will need an outpatient colon cancer screening colonoscopy  - f/u gastric emptying study     Note not final until case discussed with attending.    Patient is a 79 yo female w/ hx of HTN, HLD, Depression, Anxiety, ?Dementia who is being admitted for nausea/vomiting, and an inability to tolerate any PO intake over the last 2 days.     # Nausea/ vomiting   - Difficulty with swallowing, epigastric pain and weight loss   - patient reports she had a normal EGD 2 years ago  - s/p EGD on 9/5 and tolerating a liquid diet   - pending gastric emptying study as part of gastroparesis work up    # Diarrhea  - patient reports 2 episodes of non-bloody watery diarrhea over the last 5 days which has since resolved   - CT showed wall thickening versus underdistention in the partially visualized distal transverse colon and proximal descending colon- follow up imaging showing Stomach is incompletely distended. No focal gastric mass. No gastric   outlet obstruction. Descending sigmoid portions the colon not well distended. No maura   colitis. No diverticulitis.  - patient is afebrile and abdominal exam with minimal abdominal tenderness    Recommendations:   - given unintentional weight loss and anemia, patient will need an outpatient colon cancer screening colonoscopy  - f/u gastric emptying study   - advance diet as tolerated    Patient is a 79 yo female w/ hx of HTN, HLD, Depression, Anxiety, ?Dementia who is being admitted for nausea/vomiting, and an inability to tolerate any PO intake over the last 2 days.     # Nausea/ vomiting   - Difficulty with swallowing, epigastric pain and weight loss   - patient reports she had a normal EGD 2 years ago  - s/p EGD on 9/5 and tolerating a liquid diet   - pending gastric emptying study as part of gastroparesis work up    # Diarrhea  - patient reports 2 episodes of non-bloody watery diarrhea over the last 5 days which has since resolved   - CT showed wall thickening versus underdistention in the partially visualized distal transverse colon and proximal descending colon- follow up imaging showing Stomach is incompletely distended. No focal gastric mass. No gastric   outlet obstruction. Descending sigmoid portions the colon not well distended. No maura   colitis. No diverticulitis.  - patient is afebrile and abdominal exam with minimal abdominal tenderness    Recommendations:   - given unintentional weight loss and anemia, patient will need an outpatient colon cancer screening colonoscopy  - f/u gastric emptying study   - advance diet as tolerated   - If gastric emptying study is normal, can f/u as outpatient for further workup

## 2023-09-06 NOTE — CONSULT NOTE ADULT - ASSESSMENT
79yo female with PMHx HTN, HLD, Depression, Anxiety, ?Dementia who was admitted for nausea, vomiting, and inability to tolerate PO for 2 days and has decreased appetite for 4-5 months with 10lb weight loss since June. PT is s/p EGD on 9/5 with findings of  Normal esophagus, Hiatal hernia, Erythematous mucosa in the antrum - pending gastric emptying evaluation. ENT called to evaluate for dysphagia. Pt states she is able to swallow and can tolerate soft diet. She denies fever, chills, n/v, HA, SOB, dysphagia, odynophagia, hemoptysis, hoarseness at this time. Indirect laryngoscopy unremarkable.

## 2023-09-06 NOTE — PROGRESS NOTE ADULT - SUBJECTIVE AND OBJECTIVE BOX
Patient is a 78y old  Female who presents with a chief complaint of nausea, vomiting (06 Sep 2023 11:06)      SUBJECTIVE / OVERNIGHT EVENTS: daughter at bedside, translating in [place]ien, pt feels better, no cp, sob, abd pain, n/v is better     MEDICATIONS  (STANDING):  amitriptyline 10 milliGRAM(s) Oral daily  atorvastatin 40 milliGRAM(s) Oral at bedtime  cefTRIAXone   IVPB 1000 milliGRAM(s) IV Intermittent every 24 hours  enoxaparin Injectable 40 milliGRAM(s) SubCutaneous every 24 hours  ferrous    sulfate 325 milliGRAM(s) Oral daily  fluticasone propionate 50 MICROgram(s)/spray Nasal Spray 1 Spray(s) Both Nostrils two times a day  guaiFENesin  milliGRAM(s) Oral every 12 hours  lactobacillus acidophilus 1 Tablet(s) Oral two times a day with meals  loratadine 10 milliGRAM(s) Oral daily  losartan 50 milliGRAM(s) Oral daily  metroNIDAZOLE  IVPB 500 milliGRAM(s) IV Intermittent every 8 hours  metroNIDAZOLE  IVPB      ondansetron Injectable 4 milliGRAM(s) IV Push once  pantoprazole    Tablet 40 milliGRAM(s) Oral before breakfast  triamcinolone 0.1% Cream 1 Application(s) Topical every 8 hours    MEDICATIONS  (PRN):  acetaminophen     Tablet .. 650 milliGRAM(s) Oral every 6 hours PRN Temp greater or equal to 38C (100.4F), Mild Pain (1 - 3), Moderate Pain (4 - 6)  aluminum hydroxide/magnesium hydroxide/simethicone Suspension 30 milliLiter(s) Oral every 6 hours PRN Dyspepsia        CAPILLARY BLOOD GLUCOSE        I&O's Summary    05 Sep 2023 07:01  -  06 Sep 2023 07:00  --------------------------------------------------------  IN: 570 mL / OUT: 0 mL / NET: 570 mL    06 Sep 2023 07:01  -  06 Sep 2023 13:29  --------------------------------------------------------  IN: 240 mL / OUT: 0 mL / NET: 240 mL        PHYSICAL EXAM:  GENERAL: NAD, well-developed  HEAD:  Atraumatic, Normocephalic  EYES: conjunctiva and sclera clear  NECK:  No JVD  CHEST/LUNG: Clear to auscultation bilaterally; No wheeze  HEART: Regular rate and rhythm; No murmurs, rubs, or gallops  ABDOMEN: Soft, Nontender, Nondistended; Bowel sounds present  EXTREMITIES:  2+ Peripheral Pulses,trace edema le bl  PSYCH: AAOx3      LABS:                        8.7    8.98  )-----------( 298      ( 06 Sep 2023 06:42 )             26.9     09-06    136  |  105  |  14  ----------------------------<  98  4.0   |  18<L>  |  0.71    Ca    9.3      06 Sep 2023 06:42  Mg     1.7     09-06      PT/INR - ( 06 Sep 2023 06:42 )   PT: 14.8 sec;   INR: 1.36 ratio         PTT - ( 06 Sep 2023 06:42 )  PTT:32.7 sec      Urinalysis Basic - ( 06 Sep 2023 06:42 )    Color: x / Appearance: x / SG: x / pH: x  Gluc: 98 mg/dL / Ketone: x  / Bili: x / Urobili: x   Blood: x / Protein: x / Nitrite: x   Leuk Esterase: x / RBC: x / WBC x   Sq Epi: x / Non Sq Epi: x / Bacteria: x        RADIOLOGY & ADDITIONAL TESTS:    Imaging Personally Reviewed:    Consultant(s) Notes Reviewed:      Care Discussed with Consultants/Other Providers:

## 2023-09-06 NOTE — PROGRESS NOTE ADULT - SUBJECTIVE AND OBJECTIVE BOX
Patient is a 78y old  Female who presents with a chief complaint of nausea, vomiting (05 Sep 2023 13:14)     INTERVAL HPI/OVERNIGHT EVENTS:  - No acute events overnight    SUBJECTIVE  - Patient seen and evaluated at bedside. Patient states that she has been tolerating a liquid diet following the EGD. Patient is eager to tolerate a more advanced diet. Patient states that she has some abdominal pain when she coughs and has some nausea, but denies any vomiting. Patient denies any constipation, diarrhea, hematochezia, or melena     MEDICATIONS  (STANDING):  amitriptyline 10 milliGRAM(s) Oral daily  atorvastatin 40 milliGRAM(s) Oral at bedtime  cefTRIAXone   IVPB 1000 milliGRAM(s) IV Intermittent every 24 hours  enoxaparin Injectable 40 milliGRAM(s) SubCutaneous every 24 hours  ferrous    sulfate 325 milliGRAM(s) Oral daily  fluticasone propionate 50 MICROgram(s)/spray Nasal Spray 1 Spray(s) Both Nostrils two times a day  guaiFENesin  milliGRAM(s) Oral every 12 hours  lactobacillus acidophilus 1 Tablet(s) Oral two times a day with meals  loratadine 10 milliGRAM(s) Oral daily  losartan 50 milliGRAM(s) Oral daily  metroNIDAZOLE  IVPB      metroNIDAZOLE  IVPB 500 milliGRAM(s) IV Intermittent every 8 hours  ondansetron Injectable 4 milliGRAM(s) IV Push once  pantoprazole    Tablet 40 milliGRAM(s) Oral before breakfast  triamcinolone 0.1% Cream 1 Application(s) Topical every 8 hours    MEDICATIONS  (PRN):  acetaminophen     Tablet .. 650 milliGRAM(s) Oral every 6 hours PRN Temp greater or equal to 38C (100.4F), Mild Pain (1 - 3), Moderate Pain (4 - 6)  aluminum hydroxide/magnesium hydroxide/simethicone Suspension 30 milliLiter(s) Oral every 6 hours PRN Dyspepsia        REVIEW OF SYSTEMS: As indicated above; otherwise, negative    VITAL SIGNS:  T(F): 99.5 (09-06-23 @ 04:41), Max: 99.5 (09-06-23 @ 04:41)  HR: 78 (09-06-23 @ 04:41) (70 - 82)  BP: 123/83 (09-06-23 @ 04:41) (95/55 - 123/83)  RR: 18 (09-06-23 @ 04:41) (17 - 20)  SpO2: 96% (09-06-23 @ 04:41) (96% - 100%)    PHYSICAL EXAM:  General: NAD, well groomed, well developed  Eyes: pupils equal, round, reactive to light, anicteric sclera  ENMT: slightly dry mucous membranes   Neck: supple, no JVD, no tender lymphadenopathy   Lungs: clear lung fields bilaterally, no wheezes, rales, or rhonchi, no accessory muscle use for respirations   Heart: regular rate and rhythm, normal s1,s2, no murmurs, rubs, or gallops   Abdomen: soft, non-tender, non-distended   Extremities: warm, well perfused, no lower extremity edema bilaterally   Neuro: AOX3, no focal neurological deficits     LABS:                        8.7    8.98  )-----------( 298      ( 06 Sep 2023 06:42 )             26.9     06 Sep 2023 06:42    136    |  105    |  14     ----------------------------<  98     4.0     |  18     |  0.71     Ca    9.3        06 Sep 2023 06:42  Mg     1.7       06 Sep 2023 06:42    PT/INR - ( 06 Sep 2023 06:42 )   PT: 14.8 sec;   INR: 1.36 ratio         PTT - ( 06 Sep 2023 06:42 )  PTT:32.7 sec  CAPILLARY BLOOD GLUCOSE        BLOOD CULTURE  09-03 @ 07:03   No growth at 72 Hours  --  --  09-02 @ 13:25   No enteric pathogens isolated.  (Stool culture examined for Salmonella,  Shigella, Campylobacter, Aeromonas, Plesiomonas,  Vibrio, E.coli O157 and Yersinia)  No enteric gram negative rods isolated  Moderate Yeast like cells  --  --    RADIOLOGY & ADDITIONAL TESTS:    Imaging Personally Reviewed:  [X ] YES     Consultant(s) Notes Reviewed:  Yes    Care Discussed with Consultants/Other Providers: Yes Patient is a 78y old  Female who presents with a chief complaint of nausea, vomiting (05 Sep 2023 13:14)     INTERVAL HPI/OVERNIGHT EVENTS:  - No acute events overnight    SUBJECTIVE  - Patient seen and evaluated at bedside. Patient states that she has been tolerating a liquid diet following the EGD. Patient is eager to tolerate a more advanced diet. Patient states that she has some abdominal pain when she coughs and has some nausea, but denies any vomiting. Patient denies any constipation, diarrhea, hematochezia, or melena. She denies dysphagia and her daughter reiterates she does not have dysphagia, attributing prior inaccurate reporting due to English as her second language.    MEDICATIONS  (STANDING):  amitriptyline 10 milliGRAM(s) Oral daily  atorvastatin 40 milliGRAM(s) Oral at bedtime  cefTRIAXone   IVPB 1000 milliGRAM(s) IV Intermittent every 24 hours  enoxaparin Injectable 40 milliGRAM(s) SubCutaneous every 24 hours  ferrous    sulfate 325 milliGRAM(s) Oral daily  fluticasone propionate 50 MICROgram(s)/spray Nasal Spray 1 Spray(s) Both Nostrils two times a day  guaiFENesin  milliGRAM(s) Oral every 12 hours  lactobacillus acidophilus 1 Tablet(s) Oral two times a day with meals  loratadine 10 milliGRAM(s) Oral daily  losartan 50 milliGRAM(s) Oral daily  metroNIDAZOLE  IVPB      metroNIDAZOLE  IVPB 500 milliGRAM(s) IV Intermittent every 8 hours  ondansetron Injectable 4 milliGRAM(s) IV Push once  pantoprazole    Tablet 40 milliGRAM(s) Oral before breakfast  triamcinolone 0.1% Cream 1 Application(s) Topical every 8 hours    MEDICATIONS  (PRN):  acetaminophen     Tablet .. 650 milliGRAM(s) Oral every 6 hours PRN Temp greater or equal to 38C (100.4F), Mild Pain (1 - 3), Moderate Pain (4 - 6)  aluminum hydroxide/magnesium hydroxide/simethicone Suspension 30 milliLiter(s) Oral every 6 hours PRN Dyspepsia        REVIEW OF SYSTEMS: As indicated above; otherwise, negative    VITAL SIGNS:  T(F): 99.5 (09-06-23 @ 04:41), Max: 99.5 (09-06-23 @ 04:41)  HR: 78 (09-06-23 @ 04:41) (70 - 82)  BP: 123/83 (09-06-23 @ 04:41) (95/55 - 123/83)  RR: 18 (09-06-23 @ 04:41) (17 - 20)  SpO2: 96% (09-06-23 @ 04:41) (96% - 100%)    PHYSICAL EXAM:  General: NAD, well groomed, well developed  Eyes: pupils equal, round, reactive to light, anicteric sclera  ENMT: slightly dry mucous membranes   Neck: supple, no JVD, no tender lymphadenopathy   Lungs: clear lung fields bilaterally, no wheezes, rales, or rhonchi, no accessory muscle use for respirations   Heart: regular rate and rhythm, normal s1,s2, no murmurs, rubs, or gallops   Abdomen: soft, non-tender, non-distended   Extremities: warm, well perfused, no lower extremity edema bilaterally   Neuro: AOX3, no focal neurological deficits     LABS:                        8.7    8.98  )-----------( 298      ( 06 Sep 2023 06:42 )             26.9     06 Sep 2023 06:42    136    |  105    |  14     ----------------------------<  98     4.0     |  18     |  0.71     Ca    9.3        06 Sep 2023 06:42  Mg     1.7       06 Sep 2023 06:42    PT/INR - ( 06 Sep 2023 06:42 )   PT: 14.8 sec;   INR: 1.36 ratio         PTT - ( 06 Sep 2023 06:42 )  PTT:32.7 sec  CAPILLARY BLOOD GLUCOSE        BLOOD CULTURE  09-03 @ 07:03   No growth at 72 Hours  --  --  09-02 @ 13:25   No enteric pathogens isolated.  (Stool culture examined for Salmonella,  Shigella, Campylobacter, Aeromonas, Plesiomonas,  Vibrio, E.coli O157 and Yersinia)  No enteric gram negative rods isolated  Moderate Yeast like cells  --  --    RADIOLOGY & ADDITIONAL TESTS:    Imaging Personally Reviewed:  [X ] YES     Consultant(s) Notes Reviewed:  Yes    Care Discussed with Consultants/Other Providers: Yes    Imaging:  < from: CT Abdomen and Pelvis w/ IV Cont (09.01.23 @ 19:14) >  FINDINGS:  LOWER CHEST: Minimal bibasilar subsegmental atelectasis    LIVER: Within normal limits.  BILE DUCTS: Normal caliber.  GALLBLADDER: Cholecystectomy.  SPLEEN: Within normal limits.  PANCREAS: Within normal limits.  ADRENALS: Within normal limits.  KIDNEYS/URETERS: No hydronephrosis. Nonobstructing by 5.7 mm   calcification within the cortex upper pole left kidney. No other renal or   ureteral stones. Small cysts left kidney too small to characterize.    BLADDER: Within normal limits.  REPRODUCTIVE ORGANS: Uterus and adnexa within normal limits.    Stomach is incompletely distended. No focal gastric mass. No gastric   outlet obstruction.    Descending sigmoid portions the colon not well distended. No maura   colitis. No diverticulitis.    BOWEL:No bowel obstruction. Appendix prior appendectomy suggested.  PERITONEUM: Trace fluid within the right side of the pelvis.  No free air or abscess.  No pneumatosis or portal venous gas to suggest intestinal ischemia.  VESSELS: Atherosclerotic changes.  RETROPERITONEUM/LYMPH NODES: No lymphadenopathy.  ABDOMINAL WALL: Within normal limits.  BONES: Degenerative changes. No lytic or blastic process.    IMPRESSION:  No bowel obstruction, free air or abscess. Trace fluid within the right   side of pelvis.    Please refer to detailed findings otherwise described above.    < end of copied text >

## 2023-09-07 DIAGNOSIS — R05.9 COUGH, UNSPECIFIED: ICD-10-CM

## 2023-09-07 LAB
ANION GAP SERPL CALC-SCNC: 12 MMOL/L — SIGNIFICANT CHANGE UP (ref 5–17)
APTT BLD: 29.6 SEC — SIGNIFICANT CHANGE UP (ref 24.5–35.6)
BUN SERPL-MCNC: 13 MG/DL — SIGNIFICANT CHANGE UP (ref 7–23)
CALCIUM SERPL-MCNC: 8.8 MG/DL — SIGNIFICANT CHANGE UP (ref 8.4–10.5)
CHLORIDE SERPL-SCNC: 106 MMOL/L — SIGNIFICANT CHANGE UP (ref 96–108)
CO2 SERPL-SCNC: 20 MMOL/L — LOW (ref 22–31)
CREAT SERPL-MCNC: 0.66 MG/DL — SIGNIFICANT CHANGE UP (ref 0.5–1.3)
EGFR: 91 ML/MIN/1.73M2 — SIGNIFICANT CHANGE UP
GLUCOSE BLDC GLUCOMTR-MCNC: 115 MG/DL — HIGH (ref 70–99)
GLUCOSE SERPL-MCNC: 123 MG/DL — HIGH (ref 70–99)
HCT VFR BLD CALC: 30.3 % — LOW (ref 34.5–45)
HGB BLD-MCNC: 9.6 G/DL — LOW (ref 11.5–15.5)
INR BLD: 1.4 RATIO — HIGH (ref 0.85–1.18)
MCHC RBC-ENTMCNC: 24.5 PG — LOW (ref 27–34)
MCHC RBC-ENTMCNC: 31.7 GM/DL — LOW (ref 32–36)
MCV RBC AUTO: 77.3 FL — LOW (ref 80–100)
NRBC # BLD: 0 /100 WBCS — SIGNIFICANT CHANGE UP (ref 0–0)
PLATELET # BLD AUTO: 326 K/UL — SIGNIFICANT CHANGE UP (ref 150–400)
POTASSIUM SERPL-MCNC: 3.6 MMOL/L — SIGNIFICANT CHANGE UP (ref 3.5–5.3)
POTASSIUM SERPL-SCNC: 3.6 MMOL/L — SIGNIFICANT CHANGE UP (ref 3.5–5.3)
PROTHROM AB SERPL-ACNC: 14.6 SEC — HIGH (ref 9.5–13)
RBC # BLD: 3.92 M/UL — SIGNIFICANT CHANGE UP (ref 3.8–5.2)
RBC # FLD: 14.6 % — HIGH (ref 10.3–14.5)
SODIUM SERPL-SCNC: 138 MMOL/L — SIGNIFICANT CHANGE UP (ref 135–145)
SURGICAL PATHOLOGY STUDY: SIGNIFICANT CHANGE UP
WBC # BLD: 9.35 K/UL — SIGNIFICANT CHANGE UP (ref 3.8–10.5)
WBC # FLD AUTO: 9.35 K/UL — SIGNIFICANT CHANGE UP (ref 3.8–10.5)

## 2023-09-07 PROCEDURE — 71045 X-RAY EXAM CHEST 1 VIEW: CPT | Mod: 26

## 2023-09-07 PROCEDURE — 78264 GASTRIC EMPTYING IMG STUDY: CPT | Mod: 26

## 2023-09-07 PROCEDURE — 99233 SBSQ HOSP IP/OBS HIGH 50: CPT

## 2023-09-07 RX ORDER — ZALEPLON 10 MG
5 CAPSULE ORAL ONCE
Refills: 0 | Status: DISCONTINUED | OUTPATIENT
Start: 2023-09-07 | End: 2023-09-11

## 2023-09-07 RX ORDER — SODIUM CHLORIDE 9 MG/ML
1000 INJECTION, SOLUTION INTRAVENOUS
Refills: 0 | Status: DISCONTINUED | OUTPATIENT
Start: 2023-09-07 | End: 2023-09-09

## 2023-09-07 RX ADMIN — LIDOCAINE 1 PATCH: 4 CREAM TOPICAL at 06:36

## 2023-09-07 RX ADMIN — Medication 1 SPRAY(S): at 18:35

## 2023-09-07 RX ADMIN — Medication 100 MILLIGRAM(S): at 05:21

## 2023-09-07 RX ADMIN — SODIUM CHLORIDE 55 MILLILITER(S): 9 INJECTION, SOLUTION INTRAVENOUS at 12:31

## 2023-09-07 RX ADMIN — LORATADINE 10 MILLIGRAM(S): 10 TABLET ORAL at 12:32

## 2023-09-07 RX ADMIN — Medication 325 MILLIGRAM(S): at 12:32

## 2023-09-07 RX ADMIN — LIDOCAINE 1 PATCH: 4 CREAM TOPICAL at 19:30

## 2023-09-07 RX ADMIN — Medication 1 APPLICATION(S): at 22:58

## 2023-09-07 RX ADMIN — LIDOCAINE 1 PATCH: 4 CREAM TOPICAL at 12:34

## 2023-09-07 RX ADMIN — ATORVASTATIN CALCIUM 40 MILLIGRAM(S): 80 TABLET, FILM COATED ORAL at 22:57

## 2023-09-07 RX ADMIN — Medication 10 MILLIGRAM(S): at 12:31

## 2023-09-07 RX ADMIN — LIDOCAINE 1 PATCH: 4 CREAM TOPICAL at 06:37

## 2023-09-07 RX ADMIN — Medication 600 MILLIGRAM(S): at 18:01

## 2023-09-07 RX ADMIN — ENOXAPARIN SODIUM 40 MILLIGRAM(S): 100 INJECTION SUBCUTANEOUS at 12:34

## 2023-09-07 RX ADMIN — Medication 500000 UNIT(S): at 12:33

## 2023-09-07 RX ADMIN — Medication 1 APPLICATION(S): at 05:27

## 2023-09-07 RX ADMIN — Medication 1 TABLET(S): at 11:51

## 2023-09-07 RX ADMIN — Medication 1 APPLICATION(S): at 18:02

## 2023-09-07 RX ADMIN — Medication 1 TABLET(S): at 18:01

## 2023-09-07 NOTE — PROGRESS NOTE ADULT - PROBLEM SELECTOR PLAN 8
Diet: advanced soft diet, ensure  Dvt PPx: Lovenox 40 qD  Dispo: home PT     D/w daughter 9/6 updated on full plan of care - Mild cough   - No fever, wbc   - COVID negative  - Check cxr  - Tessalon pearles prn   - Likely in the setting of acidity/reflux- c/w protonix/maalox

## 2023-09-07 NOTE — SWALLOW BEDSIDE ASSESSMENT ADULT - SLP GENERAL OBSERVATIONS
Pt found in bed, awake an alert. Daughter present. Pt on room air, following directions for evaluation purposes and communicating needs. Pt speaking English and Monegasque during exam. pt reporting, "I cannot swallow" and endorses reduced appetite.

## 2023-09-07 NOTE — PROGRESS NOTE ADULT - PROBLEM SELECTOR PLAN 1
- Patient presenting with n/v for 2 days and inability to tolerate po intake.   - May be related to recent viral illness (+enterovirus on 8/10)  - Also was on steroids for recent viral illness/enterorhinovirus   - +thrush on exam   - C/w nystatin suspension   - C/w Zofran PRN for nausea  - C/w protonix  - C/w maalox prn   - S/p EGD 9/5 with Normal esophagus, Hiatal hernia, Erythematous mucosa in the antrum, Normal duodenal bulb  - Follow up path from biopsies   - Pending gastric emptying study   - SS eval   - ENT eval  - Outpatient referral for esophageal manometry.  - Will need outpt colonoscopy   - GI eval - Patient presenting with n/v for 2 days and inability to tolerate po intake prior to admission   - May be related to recent viral illness (+enterovirus on 8/10)  - Also was on steroids for recent viral illness/enterovirus   - +thrush on exam   - C/w nystatin suspension   - C/w Zofran PRN for nausea  - C/w protonix  - C/w maalox prn   - S/p EGD 9/5 with Normal esophagus, Hiatal hernia, Erythematous mucosa in the antrum, Normal duodenal bulb  - Follow up path from biopsies   - Part 1 of gastric emptying study to liquids normal, Part 2 to solids 9/8   - SS eval- d/w SS 9/7 pending eval today, daughter wants to be present during eval   - ENT eval- Indirect laryngoscopy unremarkable. No further recs   - Outpatient referral for esophageal manometry.  - Will need outpt colonoscopy   - GI follow up as outpt   - C/w IVF D2 1/2 NS for 24 hrs  - C/w clears diet for now, advance per SS recs

## 2023-09-07 NOTE — SWALLOW BEDSIDE ASSESSMENT ADULT - ORAL PREPARATORY PHASE
Within functional limits slow but adequate mastication, intermittently holding in mouth and appears hesitant-appears behavioral in nature. this improved with positive reinforcement during exam.

## 2023-09-07 NOTE — PROGRESS NOTE ADULT - SUBJECTIVE AND OBJECTIVE BOX
Patient is a 78y old  Female who presents with a chief complaint of nausea, vomiting (06 Sep 2023 16:16)      SUBJECTIVE / OVERNIGHT EVENTS: daughter at bedside translating in [place] Zealander, pt feels anxious, tolerating po, no n/v today, no cp, sob     MEDICATIONS  (STANDING):  amitriptyline 10 milliGRAM(s) Oral daily  atorvastatin 40 milliGRAM(s) Oral at bedtime  dextrose 5% + sodium chloride 0.45%. 1000 milliLiter(s) (55 mL/Hr) IV Continuous <Continuous>  enoxaparin Injectable 40 milliGRAM(s) SubCutaneous every 24 hours  ferrous    sulfate 325 milliGRAM(s) Oral daily  fluticasone propionate 50 MICROgram(s)/spray Nasal Spray 1 Spray(s) Both Nostrils two times a day  guaiFENesin  milliGRAM(s) Oral every 12 hours  lactobacillus acidophilus 1 Tablet(s) Oral two times a day with meals  lidocaine   4% Patch 1 Patch Transdermal daily  loratadine 10 milliGRAM(s) Oral daily  losartan 50 milliGRAM(s) Oral daily  nystatin    Suspension 349842 Unit(s) Oral daily  ondansetron Injectable 4 milliGRAM(s) IV Push once  pantoprazole    Tablet 40 milliGRAM(s) Oral before breakfast  triamcinolone 0.1% Cream 1 Application(s) Topical every 8 hours    MEDICATIONS  (PRN):  acetaminophen     Tablet .. 650 milliGRAM(s) Oral every 6 hours PRN Temp greater or equal to 38C (100.4F), Mild Pain (1 - 3), Moderate Pain (4 - 6)  aluminum hydroxide/magnesium hydroxide/simethicone Suspension 30 milliLiter(s) Oral every 6 hours PRN Dyspepsia  benzonatate 100 milliGRAM(s) Oral three times a day PRN Cough        CAPILLARY BLOOD GLUCOSE      POCT Blood Glucose.: 115 mg/dL (07 Sep 2023 08:13)    I&O's Summary    06 Sep 2023 07:01  -  07 Sep 2023 07:00  --------------------------------------------------------  IN: 1420 mL / OUT: 0 mL / NET: 1420 mL    07 Sep 2023 07:01  -  07 Sep 2023 13:00  --------------------------------------------------------  IN: 200 mL / OUT: 0 mL / NET: 200 mL        PHYSICAL EXAM:  GENERAL: NAD, well-developed +oral thrush   HEAD:  Atraumatic, Normocephalic  EYES: conjunctiva and sclera clear  NECK: No JVD  CHEST/LUNG: Clear to auscultation bilaterally; No wheeze  HEART: Regular rate and rhythm; S1S2  ABDOMEN: Soft, Nontender, Nondistended; Bowel sounds present  EXTREMITIES:  2+ Peripheral Pulses, trace edema le bl, + varicose veins   PSYCH: AAOx2*3  NEUROLOGY: non-focal  SKIN: No rashes or lesions    LABS:                        9.6    9.35  )-----------( 326      ( 07 Sep 2023 06:31 )             30.3     09-07    138  |  106  |  13  ----------------------------<  123<H>  3.6   |  20<L>  |  0.66    Ca    8.8      07 Sep 2023 06:32  Mg     1.7     09-06      PT/INR - ( 07 Sep 2023 06:33 )   PT: 14.6 sec;   INR: 1.40 ratio         PTT - ( 07 Sep 2023 06:33 )  PTT:29.6 sec      Urinalysis Basic - ( 07 Sep 2023 06:32 )    Color: x / Appearance: x / SG: x / pH: x  Gluc: 123 mg/dL / Ketone: x  / Bili: x / Urobili: x   Blood: x / Protein: x / Nitrite: x   Leuk Esterase: x / RBC: x / WBC x   Sq Epi: x / Non Sq Epi: x / Bacteria: x        RADIOLOGY & ADDITIONAL TESTS:    Imaging Personally Reviewed:    Consultant(s) Notes Reviewed:      Care Discussed with Consultants/Other Providers: Speech/swallow

## 2023-09-07 NOTE — SWALLOW BEDSIDE ASSESSMENT ADULT - NS ASR SWALLOW FINDINGS DISCUS
CLARK Moise, Dr Deejay Ferro via teams/Nursing/Patient/Family
CLARK Alanis; KEMAR Gutiérrez/Nursing/Patient

## 2023-09-07 NOTE — SWALLOW BEDSIDE ASSESSMENT ADULT - ASR SWALLOW ASPIRATION MONITOR
Monitor for s/s aspiration/laryngeal penetration. If noted:  D/C p.o. intake, provide non-oral nutrition/hydration/meds, and contact this service @ [phone]/change of breathing pattern/cough/gurgly voice/fever/pneumonia/throat clearing/upper respiratory infection

## 2023-09-07 NOTE — SWALLOW BEDSIDE ASSESSMENT ADULT - SWALLOW EVAL: RECOMMENDED DIET
diet deferred to GI; if there is further c/f oropharyngeal or pharyngo-esophageal stage dysphagia, please reconsult speech/swallow
regular diet

## 2023-09-07 NOTE — PROGRESS NOTE ADULT - PROBLEM SELECTOR PLAN 5
- Improved with IV hydration  - hepatitis panel, RUQ  US ok - Improved with IV hydration  - hepatitis panel, RUQ US wnl

## 2023-09-07 NOTE — SWALLOW BEDSIDE ASSESSMENT ADULT - ADDITIONAL RECOMMENDATIONS
Eval/treat for lingual thrush
swallow goals: pt will tolerate recommended diet with no s/s of aspiration.

## 2023-09-07 NOTE — PROGRESS NOTE ADULT - PROBLEM SELECTOR PLAN 2
- Patient reports 2 episodes of non-bloody watery diarrhea over the last few days which has since resolved   - CT also showing wall thickening of colon  - Blood c/s shows GPC in clusters, coag negative staph, likely contaminant    - Pt afebrile, no leukocytosis   - C/w IV ceftriaxone and flagyl total 7 day course till 9/7   - GI PCR and stool culture negative - Patient reports 2 episodes of non-bloody watery diarrhea over the last few days which has since resolved   - CT also showing wall thickening of colon  - Blood c/s shows GPC in clusters, coag negative staph, likely contaminant    - Pt afebrile, no leukocytosis   - Sp IV ceftriaxone and flagyl total 7 day course    - GI PCR and stool culture negative

## 2023-09-07 NOTE — SWALLOW BEDSIDE ASSESSMENT ADULT - SLP PERTINENT HISTORY OF CURRENT PROBLEM
77 yo female w/ hx of HTN, HLD, Depression, Anxiety, ?Dementia who is being admitted for nausea/vomiting, and an inability to tolerate any PO intake over the last 2 days.
77 yo female w/ hx of HTN, HLD, Depression, Anxiety, ?Dementia who is being admitted for nausea/vomiting, and an inability to tolerate any PO intake over the last 2 days. Pt admitted to medicine for further work-up, GI following-> Difficulty with swallowing, epigastric pain and weight loss. EGD on 9/5

## 2023-09-07 NOTE — SWALLOW BEDSIDE ASSESSMENT ADULT - SPECIFY REASON(S)
to subjectively reassess the swallow mechanism r/o dysphagia to subjectively reassess the swallow mechanism r/o dysphagia, as per d/w Dr. Villalba-patient is cleared for solids on swallow eval

## 2023-09-07 NOTE — PROGRESS NOTE ADULT - ASSESSMENT
Patient is a 78yof w/ hx of HTN, HLD, Depression, Anxiety, ?Dementia who presents with nausea vomiting for the past few days, admitted for further workup.

## 2023-09-07 NOTE — SWALLOW BEDSIDE ASSESSMENT ADULT - SWALLOW EVAL: DIAGNOSIS
Caller: Aurora Fisher    Relationship to patient: Self    Best call back number: [phone]    Patient is needing: PT STILL HAS NOT RECEIVED A CALL BACK AND IS WORRIED. UNABLE TO TRANSFER TO OFFICE.         
77 yo F admitted after 2 days of nausea and vomiting. Patient presents on bedside swallow evaluation with a grossly functional oropharyngeal swallow for tolerance of thin liquids. Exam limited to clear liquids per GI (EGD planned for 9/5). Pt denies oropharyngeal dysphagia; c/o nausea and vomiting only. Denies odynophagia. Incidentally, a white coating is noted on lingual surface. MD may consider eval/treat for thrush.
79 yo female w/ hx of HTN, HLD, Depression, Anxiety, ?Dementia admitted for work-up of nausea/vomiting and inability to tolerate PO intake for 2 days prior to admit. Pending gastric emptying study as part of gastroparesis work up. Pt presents with an overtly functional swallow sequence given thin liquid, purees an solid food. No overt, clinical s/s of laryngeal penetration/aspiration. Pt appears hesitant when eating and is slow to chew solid food, however, this appears to be behavioral in nature and not due to muscle weakness. Pt's willingness to accept trials of solid food improved given positive reinforcement as exam progressed.

## 2023-09-07 NOTE — SWALLOW BEDSIDE ASSESSMENT ADULT - COMMENTS
Continued hospital course: EGD completed 9/5-> Impression:  Normal esophagus. Biopsied. Hiatal hernia. Biopsied. Erythematous mucosa in the antrum. Biopsied. Normal duodenal bulb, first portion of the duodenum and second portion of the duodenum. No specimens collected.  Pt on clear liquid post EGD. Tolerating diet per documentation, pending gastric emptying study as part of gastroparesis work up.   Gastric emptying study 9/7-> IMPRESSION: Borderline normal liquid gastric emptying scintigraphy  ENT consulted for dysphagia-> Nasopharynx, oropharynx, and hypopharynx clear, no bleeding. Tongue base, posterior pharyngeal wall, vallecula, epiglottis, and subglottis appear normal. No erythema, edema, pooling of secretions, masses or lesions. Airway patent, no foreign body visualized. No glottic/supraglottic edema. True vocal cords, arytenoids, vestibular folds, ventricles, pyriform sinuses, and aryepiglottic folds appear normal bilaterally. Vocal cords mobile with good contact b/l. No ENT intervention required.   Per medicine note, mild cough-> Likely in the setting of acidity/reflux- c/w protonix/maalox. Continued hospital course: EGD completed 9/5-> Impression:  Normal esophagus. Biopsied. Hiatal hernia. Biopsied. Erythematous mucosa in the antrum. Biopsied. Normal duodenal bulb, first portion of the duodenum and second portion of the duodenum. No specimens collected.  Pt on clear liquid post EGD. Tolerating diet per documentation, pending gastric emptying study as part of gastroparesis work up.   Gastric emptying study 9/7-> IMPRESSION: Borderline normal liquid gastric emptying scintigraphy  ENT consulted for dysphagia-> Nasopharynx, oropharynx, and hypopharynx clear, no bleeding. Tongue base, posterior pharyngeal wall, vallecula, epiglottis, and subglottis appear normal. No erythema, edema, pooling of secretions, masses or lesions. Airway patent, no foreign body visualized. No glottic/supraglottic edema. True vocal cords, arytenoids, vestibular folds, ventricles, pyriform sinuses, and aryepiglottic folds appear normal bilaterally. Vocal cords mobile with good contact b/l. No ENT intervention required.   Per medicine note, mild cough-> Likely in the setting of acidity/reflux- c/w protonix/maalox.  On Nystatin for thrush

## 2023-09-08 LAB
ANION GAP SERPL CALC-SCNC: 13 MMOL/L — SIGNIFICANT CHANGE UP (ref 5–17)
BUN SERPL-MCNC: 10 MG/DL — SIGNIFICANT CHANGE UP (ref 7–23)
CALCIUM SERPL-MCNC: 9 MG/DL — SIGNIFICANT CHANGE UP (ref 8.4–10.5)
CHLORIDE SERPL-SCNC: 103 MMOL/L — SIGNIFICANT CHANGE UP (ref 96–108)
CO2 SERPL-SCNC: 20 MMOL/L — LOW (ref 22–31)
CREAT SERPL-MCNC: 0.68 MG/DL — SIGNIFICANT CHANGE UP (ref 0.5–1.3)
CULTURE RESULTS: SIGNIFICANT CHANGE UP
CULTURE RESULTS: SIGNIFICANT CHANGE UP
EGFR: 90 ML/MIN/1.73M2 — SIGNIFICANT CHANGE UP
GLUCOSE SERPL-MCNC: 147 MG/DL — HIGH (ref 70–99)
HCT VFR BLD CALC: 30.6 % — LOW (ref 34.5–45)
HGB BLD-MCNC: 9.7 G/DL — LOW (ref 11.5–15.5)
MCHC RBC-ENTMCNC: 24.4 PG — LOW (ref 27–34)
MCHC RBC-ENTMCNC: 31.7 GM/DL — LOW (ref 32–36)
MCV RBC AUTO: 77.1 FL — LOW (ref 80–100)
NRBC # BLD: 0 /100 WBCS — SIGNIFICANT CHANGE UP (ref 0–0)
PLATELET # BLD AUTO: 372 K/UL — SIGNIFICANT CHANGE UP (ref 150–400)
POTASSIUM SERPL-MCNC: 4.3 MMOL/L — SIGNIFICANT CHANGE UP (ref 3.5–5.3)
POTASSIUM SERPL-SCNC: 4.3 MMOL/L — SIGNIFICANT CHANGE UP (ref 3.5–5.3)
RBC # BLD: 3.97 M/UL — SIGNIFICANT CHANGE UP (ref 3.8–5.2)
RBC # FLD: 14.7 % — HIGH (ref 10.3–14.5)
SARS-COV-2 RNA SPEC QL NAA+PROBE: SIGNIFICANT CHANGE UP
SODIUM SERPL-SCNC: 136 MMOL/L — SIGNIFICANT CHANGE UP (ref 135–145)
SPECIMEN SOURCE: SIGNIFICANT CHANGE UP
SPECIMEN SOURCE: SIGNIFICANT CHANGE UP
WBC # BLD: 10.98 K/UL — HIGH (ref 3.8–10.5)
WBC # FLD AUTO: 10.98 K/UL — HIGH (ref 3.8–10.5)

## 2023-09-08 PROCEDURE — 99233 SBSQ HOSP IP/OBS HIGH 50: CPT

## 2023-09-08 RX ORDER — SODIUM CHLORIDE 9 MG/ML
1000 INJECTION INTRAMUSCULAR; INTRAVENOUS; SUBCUTANEOUS
Refills: 0 | Status: DISCONTINUED | OUTPATIENT
Start: 2023-09-08 | End: 2023-09-09

## 2023-09-08 RX ORDER — NYSTATIN 500MM UNIT
500000 POWDER (EA) MISCELLANEOUS
Refills: 0 | Status: DISCONTINUED | OUTPATIENT
Start: 2023-09-08 | End: 2023-09-11

## 2023-09-08 RX ORDER — IPRATROPIUM/ALBUTEROL SULFATE 18-103MCG
3 AEROSOL WITH ADAPTER (GRAM) INHALATION EVERY 6 HOURS
Refills: 0 | Status: DISCONTINUED | OUTPATIENT
Start: 2023-09-08 | End: 2023-09-11

## 2023-09-08 RX ORDER — BUDESONIDE, MICRONIZED 100 %
0.5 POWDER (GRAM) MISCELLANEOUS
Refills: 0 | Status: COMPLETED | OUTPATIENT
Start: 2023-09-08 | End: 2023-09-10

## 2023-09-08 RX ADMIN — LIDOCAINE 1 PATCH: 4 CREAM TOPICAL at 19:21

## 2023-09-08 RX ADMIN — Medication 1 TABLET(S): at 09:06

## 2023-09-08 RX ADMIN — Medication 10 MILLIGRAM(S): at 11:37

## 2023-09-08 RX ADMIN — Medication 0.5 MILLIGRAM(S): at 18:16

## 2023-09-08 RX ADMIN — ENOXAPARIN SODIUM 40 MILLIGRAM(S): 100 INJECTION SUBCUTANEOUS at 11:37

## 2023-09-08 RX ADMIN — Medication 3 MILLILITER(S): at 13:27

## 2023-09-08 RX ADMIN — Medication 600 MILLIGRAM(S): at 05:41

## 2023-09-08 RX ADMIN — Medication 500000 UNIT(S): at 18:24

## 2023-09-08 RX ADMIN — Medication 600 MILLIGRAM(S): at 18:28

## 2023-09-08 RX ADMIN — LIDOCAINE 1 PATCH: 4 CREAM TOPICAL at 11:37

## 2023-09-08 RX ADMIN — Medication 3 MILLILITER(S): at 18:16

## 2023-09-08 RX ADMIN — Medication 1 TABLET(S): at 18:25

## 2023-09-08 RX ADMIN — LORATADINE 10 MILLIGRAM(S): 10 TABLET ORAL at 11:37

## 2023-09-08 RX ADMIN — Medication 325 MILLIGRAM(S): at 11:40

## 2023-09-08 RX ADMIN — Medication 1 SPRAY(S): at 18:15

## 2023-09-08 RX ADMIN — LIDOCAINE 1 PATCH: 4 CREAM TOPICAL at 23:44

## 2023-09-08 RX ADMIN — ATORVASTATIN CALCIUM 40 MILLIGRAM(S): 80 TABLET, FILM COATED ORAL at 21:08

## 2023-09-08 RX ADMIN — Medication 0.5 MILLIGRAM(S): at 12:16

## 2023-09-08 RX ADMIN — SODIUM CHLORIDE 75 MILLILITER(S): 9 INJECTION INTRAMUSCULAR; INTRAVENOUS; SUBCUTANEOUS at 18:15

## 2023-09-08 RX ADMIN — Medication 1 SPRAY(S): at 05:41

## 2023-09-08 RX ADMIN — PANTOPRAZOLE SODIUM 40 MILLIGRAM(S): 20 TABLET, DELAYED RELEASE ORAL at 07:49

## 2023-09-08 RX ADMIN — Medication 1 APPLICATION(S): at 05:42

## 2023-09-08 RX ADMIN — LIDOCAINE 1 PATCH: 4 CREAM TOPICAL at 00:18

## 2023-09-08 RX ADMIN — Medication 100 MILLIGRAM(S): at 21:09

## 2023-09-08 RX ADMIN — Medication 1 APPLICATION(S): at 21:10

## 2023-09-08 RX ADMIN — Medication 500000 UNIT(S): at 11:37

## 2023-09-08 RX ADMIN — Medication 100 MILLIGRAM(S): at 11:40

## 2023-09-08 RX ADMIN — LOSARTAN POTASSIUM 50 MILLIGRAM(S): 100 TABLET, FILM COATED ORAL at 05:40

## 2023-09-08 NOTE — PROGRESS NOTE ADULT - SUBJECTIVE AND OBJECTIVE BOX
[facility] Division of Hospital Medicine  Homer England MD, [facility]  I'm reachable on [employer] Teams   Off hours:  [phone] ([facility] pager)    Patient is a 78y old  Female who presents with a chief complaint of nausea, vomiting (07 Sep 2023 13:00)      SUBJECTIVE / OVERNIGHT EVENTS:  No events overnight. This morning the patient is complaining of an acute on chronic cough (present for about 1-2 monthss). Appetite and ability to tolerate her diet has improved. Daughter at bedside - we discussed the plan of care in detail - we'll not pursue the NM gastric emptying study with solids given the improvement in symptoms.     MEDICATIONS  (STANDING):  albuterol/ipratropium for Nebulization 3 milliLiter(s) Nebulizer every 6 hours  amitriptyline 10 milliGRAM(s) Oral daily  atorvastatin 40 milliGRAM(s) Oral at bedtime  buDESOnide    Inhalation Suspension 0.5 milliGRAM(s) Inhalation two times a day  dextrose 5% + sodium chloride 0.45%. 1000 milliLiter(s) (55 mL/Hr) IV Continuous <Continuous>  enoxaparin Injectable 40 milliGRAM(s) SubCutaneous every 24 hours  ferrous    sulfate 325 milliGRAM(s) Oral daily  fluticasone propionate 50 MICROgram(s)/spray Nasal Spray 1 Spray(s) Both Nostrils two times a day  guaiFENesin  milliGRAM(s) Oral every 12 hours  hydrocodone/homatropine Syrup 5 milliLiter(s) Oral four times a day  lactobacillus acidophilus 1 Tablet(s) Oral two times a day with meals  lidocaine   4% Patch 1 Patch Transdermal daily  loratadine 10 milliGRAM(s) Oral daily  losartan 50 milliGRAM(s) Oral daily  nystatin    Suspension 078806 Unit(s) Oral four times a day  ondansetron Injectable 4 milliGRAM(s) IV Push once  pantoprazole    Tablet 40 milliGRAM(s) Oral before breakfast  triamcinolone 0.1% Cream 1 Application(s) Topical every 8 hours    MEDICATIONS  (PRN):  acetaminophen     Tablet .. 650 milliGRAM(s) Oral every 6 hours PRN Temp greater or equal to 38C (100.4F), Mild Pain (1 - 3), Moderate Pain (4 - 6)  aluminum hydroxide/magnesium hydroxide/simethicone Suspension 30 milliLiter(s) Oral every 6 hours PRN Dyspepsia  benzonatate 100 milliGRAM(s) Oral three times a day PRN Cough  zaleplon 5 milliGRAM(s) Oral once PRN Insomnia    CAPILLARY BLOOD GLUCOSE        I&O's Summary    07 Sep 2023 07:01  -  08 Sep 2023 07:00  --------------------------------------------------------  IN: 1345 mL / OUT: 0 mL / NET: 1345 mL    08 Sep 2023 07:01  -  08 Sep 2023 14:20  --------------------------------------------------------  IN: 400 mL / OUT: 0 mL / NET: 400 mL        PHYSICAL EXAM:  Vital Signs Last 24 Hrs  T(C): 36.7 (08 Sep 2023 11:27), Max: 36.9 (07 Sep 2023 16:49)  T(F): 98.1 (08 Sep 2023 11:27), Max: 98.5 (07 Sep 2023 16:49)  HR: 85 (08 Sep 2023 11:27) (73 - 88)  BP: 94/64 (08 Sep 2023 11:27) (94/64 - 112/65)  BP(mean): --  RR: 18 (08 Sep 2023 11:27) (18 - 18)  SpO2: 97% (08 Sep 2023 11:27) (97% - 100%)    Parameters below as of 08 Sep 2023 05:01  Patient On (Oxygen Delivery Method): room air        CONSTITUTIONAL: NAD, well-developed, well-groomed  EYES:  EOMI, conjunctiva and sclera clear  ENMT: Moist oral mucosa, +oral thrush on tongue   NECK: Supple, no JVD  RESPIRATORY: Normal respiratory effort; lungs are clear to auscultation bilaterally  CARDIOVASCULAR: Regular rate and rhythm, normal S1 and S2, no murmur/rub/gallop; No lower extremity edema  ABDOMEN: normoactive bowel sounds, soft, nontender to palpation, no distension   MUSCULOSKELETAL:  no clubbing or cyanosis of digits; no joint swelling or tenderness to palpation  PSYCH: A+O x3; anxious   NEUROLOGY: CN 2-12 are intact and symmetric; no gross sensory deficits     LABS:                        9.7    10.98 )-----------( 372      ( 08 Sep 2023 11:08 )             30.6     09-08    136  |  103  |  10  ----------------------------<  147<H>  4.3   |  20<L>  |  0.68    Ca    9.0      08 Sep 2023 11:08      PT/INR - ( 07 Sep 2023 06:33 )   PT: 14.6 sec;   INR: 1.40 ratio         PTT - ( 07 Sep 2023 06:33 )  PTT:29.6 sec      Urinalysis Basic - ( 08 Sep 2023 11:08 )    Color: x / Appearance: x / SG: x / pH: x  Gluc: 147 mg/dL / Ketone: x  / Bili: x / Urobili: x   Blood: x / Protein: x / Nitrite: x   Leuk Esterase: x / RBC: x / WBC x   Sq Epi: x / Non Sq Epi: x / Bacteria: x

## 2023-09-08 NOTE — PROGRESS NOTE ADULT - PROBLEM SELECTOR PLAN 1
- acute on chronic cough - ?etiology - suspect it might be mild bronchitis, vs GERD, vs anxiety driven  - CXR is clear. Lung are clear to auscultation.   - No fever, wbc   - COVID negative - will repeat another PCR today  - Tessalon pearles not helping - per discussion with the patient and daughter at bedside, will try hycodan, Duoneb INH, and Pulmicort INH   - c/w protonix

## 2023-09-08 NOTE — PROGRESS NOTE ADULT - PROBLEM SELECTOR PLAN 2
- Patient presenting with n/v for 2 days and inability to tolerate po intake prior to admission   - May be related to recent viral illness (+enterovirus on 8/10)  - Also was on steroids for recent viral illness/enterovirus   - +thrush on exam   - C/w nystatin suspension - still present on today's exam - increase frequency to 4x/day  - C/w Zofran PRN for nausea  - C/w protonix  - C/w maalox prn   - S/p EGD 9/5 with Normal esophagus, Hiatal hernia, Erythematous mucosa in the antrum, Normal duodenal bulb  - path from biopsies consistent with mild gastritis and esophagitis.   - Part 1 of gastric emptying study to liquids normal on 9/7 was normal. Was planned for Part 2 to solids 9/8, but the patient's daughter feels that the issues with swallowing/ vomiting have resolved and this does not need to be pursued further.   - SS eval performed - rec regular diet.  - ENT eval- Indirect laryngoscopy unremarkable. No further recs   - Outpatient referral for esophageal manometry.  - Will need outpt colonoscopy   - GI follow up as outpt   - monitor on regular diet

## 2023-09-08 NOTE — PROGRESS NOTE ADULT - PROBLEM SELECTOR PLAN 8
- C/w home amitriptyline  - Hold off recently started on rexulti and trintellix  - Can be started oupatient when she sees her PCP

## 2023-09-08 NOTE — PROGRESS NOTE ADULT - PROBLEM SELECTOR PLAN 5
- Iron studies shows low Iron and Iron saturation  - C/w ferrous sulfate   - Concerning for also possible occult malignancy given age, weight loss and early satiety  - EGD as above   - Will need colonoscopy as outpt

## 2023-09-08 NOTE — PROGRESS NOTE ADULT - ASSESSMENT
78F HTN, HLD, Depression, Anxiety, ?Dementia who presents with nausea vomiting for the past few days, admitted for further workup.

## 2023-09-09 PROCEDURE — 72070 X-RAY EXAM THORAC SPINE 2VWS: CPT | Mod: 26

## 2023-09-09 PROCEDURE — 99233 SBSQ HOSP IP/OBS HIGH 50: CPT

## 2023-09-09 RX ORDER — CYCLOBENZAPRINE HYDROCHLORIDE 10 MG/1
5 TABLET, FILM COATED ORAL THREE TIMES A DAY
Refills: 0 | Status: DISCONTINUED | OUTPATIENT
Start: 2023-09-09 | End: 2023-09-11

## 2023-09-09 RX ORDER — CYCLOBENZAPRINE HYDROCHLORIDE 10 MG/1
5 TABLET, FILM COATED ORAL ONCE
Refills: 0 | Status: COMPLETED | OUTPATIENT
Start: 2023-09-09 | End: 2023-09-09

## 2023-09-09 RX ORDER — ACETAMINOPHEN 500 MG
975 TABLET ORAL EVERY 12 HOURS
Refills: 0 | Status: DISCONTINUED | OUTPATIENT
Start: 2023-09-09 | End: 2023-09-11

## 2023-09-09 RX ADMIN — LOSARTAN POTASSIUM 50 MILLIGRAM(S): 100 TABLET, FILM COATED ORAL at 05:38

## 2023-09-09 RX ADMIN — LORATADINE 10 MILLIGRAM(S): 10 TABLET ORAL at 12:14

## 2023-09-09 RX ADMIN — ATORVASTATIN CALCIUM 40 MILLIGRAM(S): 80 TABLET, FILM COATED ORAL at 21:36

## 2023-09-09 RX ADMIN — Medication 1 APPLICATION(S): at 13:16

## 2023-09-09 RX ADMIN — LIDOCAINE 1 PATCH: 4 CREAM TOPICAL at 12:15

## 2023-09-09 RX ADMIN — Medication 1 SPRAY(S): at 05:38

## 2023-09-09 RX ADMIN — Medication 3 MILLILITER(S): at 00:08

## 2023-09-09 RX ADMIN — ONDANSETRON 4 MILLIGRAM(S): 8 TABLET, FILM COATED ORAL at 13:43

## 2023-09-09 RX ADMIN — Medication 3 MILLILITER(S): at 23:13

## 2023-09-09 RX ADMIN — Medication 500000 UNIT(S): at 00:08

## 2023-09-09 RX ADMIN — Medication 1 TABLET(S): at 08:59

## 2023-09-09 RX ADMIN — Medication 3 MILLILITER(S): at 12:17

## 2023-09-09 RX ADMIN — Medication 1 APPLICATION(S): at 21:33

## 2023-09-09 RX ADMIN — Medication 500000 UNIT(S): at 12:14

## 2023-09-09 RX ADMIN — Medication 500000 UNIT(S): at 17:43

## 2023-09-09 RX ADMIN — Medication 600 MILLIGRAM(S): at 05:38

## 2023-09-09 RX ADMIN — Medication 0.5 MILLIGRAM(S): at 05:37

## 2023-09-09 RX ADMIN — Medication 1 APPLICATION(S): at 05:38

## 2023-09-09 RX ADMIN — ENOXAPARIN SODIUM 40 MILLIGRAM(S): 100 INJECTION SUBCUTANEOUS at 13:16

## 2023-09-09 RX ADMIN — CYCLOBENZAPRINE HYDROCHLORIDE 5 MILLIGRAM(S): 10 TABLET, FILM COATED ORAL at 13:14

## 2023-09-09 RX ADMIN — Medication 975 MILLIGRAM(S): at 17:42

## 2023-09-09 RX ADMIN — Medication 600 MILLIGRAM(S): at 17:44

## 2023-09-09 RX ADMIN — Medication 325 MILLIGRAM(S): at 12:15

## 2023-09-09 RX ADMIN — Medication 1 TABLET(S): at 17:43

## 2023-09-09 RX ADMIN — Medication 3 MILLILITER(S): at 17:42

## 2023-09-09 RX ADMIN — Medication 1 SPRAY(S): at 17:43

## 2023-09-09 RX ADMIN — Medication 0.5 MILLIGRAM(S): at 17:44

## 2023-09-09 RX ADMIN — Medication 10 MILLIGRAM(S): at 12:14

## 2023-09-09 RX ADMIN — Medication 3 MILLILITER(S): at 05:37

## 2023-09-09 RX ADMIN — LIDOCAINE 1 PATCH: 4 CREAM TOPICAL at 20:42

## 2023-09-09 RX ADMIN — Medication 500000 UNIT(S): at 23:13

## 2023-09-09 RX ADMIN — Medication 500000 UNIT(S): at 05:38

## 2023-09-09 RX ADMIN — PANTOPRAZOLE SODIUM 40 MILLIGRAM(S): 20 TABLET, DELAYED RELEASE ORAL at 05:38

## 2023-09-09 NOTE — PROGRESS NOTE ADULT - SUBJECTIVE AND OBJECTIVE BOX
Patient is a 78y old  Female who presents with a chief complaint of nausea, vomiting (08 Sep 2023 14:20)      SUBJECTIVE / OVERNIGHT EVENTS: daughter at bedside translating in Grenadian, reports back pain when she coughs, no cp, son     MEDICATIONS  (STANDING):  acetaminophen     Tablet .. 975 milliGRAM(s) Oral every 12 hours  albuterol/ipratropium for Nebulization 3 milliLiter(s) Nebulizer every 6 hours  amitriptyline 10 milliGRAM(s) Oral daily  atorvastatin 40 milliGRAM(s) Oral at bedtime  buDESOnide    Inhalation Suspension 0.5 milliGRAM(s) Inhalation two times a day  enoxaparin Injectable 40 milliGRAM(s) SubCutaneous every 24 hours  ferrous    sulfate 325 milliGRAM(s) Oral daily  fluticasone propionate 50 MICROgram(s)/spray Nasal Spray 1 Spray(s) Both Nostrils two times a day  guaiFENesin  milliGRAM(s) Oral every 12 hours  lactobacillus acidophilus 1 Tablet(s) Oral two times a day with meals  lidocaine   4% Patch 1 Patch Transdermal daily  loratadine 10 milliGRAM(s) Oral daily  losartan 50 milliGRAM(s) Oral daily  nystatin    Suspension 671210 Unit(s) Oral four times a day  pantoprazole    Tablet 40 milliGRAM(s) Oral before breakfast  triamcinolone 0.1% Cream 1 Application(s) Topical every 8 hours    MEDICATIONS  (PRN):  aluminum hydroxide/magnesium hydroxide/simethicone Suspension 30 milliLiter(s) Oral every 6 hours PRN Dyspepsia  benzonatate 100 milliGRAM(s) Oral three times a day PRN Cough  cyclobenzaprine 5 milliGRAM(s) Oral three times a day PRN Muscle Spasm  hydrocodone/homatropine Syrup 5 milliLiter(s) Oral four times a day PRN Cough  zaleplon 5 milliGRAM(s) Oral once PRN Insomnia        CAPILLARY BLOOD GLUCOSE        I&O's Summary    08 Sep 2023 07:01  -  09 Sep 2023 07:00  --------------------------------------------------------  IN: 795 mL / OUT: 0 mL / NET: 795 mL        PHYSICAL EXAM:  GENERAL: NAD, well-developed  HEAD:  Atraumatic, Normocephalic  EYES: EOMI, PERRLA, conjunctiva and sclera clear  NECK: Supple, No JVD  CHEST/LUNG: Clear to auscultation bilaterally; No wheeze  HEART: Regular rate and rhythm; No murmurs, rubs, or gallops  ABDOMEN: Soft, Nontender, Nondistended; Bowel sounds present  EXTREMITIES:  2+ Peripheral Pulses, No clubbing, cyanosis, or edema  PSYCH: AAOx3, anxious   NEUROLOGY: non-focal  SKIN: No rashes or lesions    LABS:                        9.7    10.98 )-----------( 372      ( 08 Sep 2023 11:08 )             30.6     09-08    136  |  103  |  10  ----------------------------<  147<H>  4.3   |  20<L>  |  0.68    Ca    9.0      08 Sep 2023 11:08            Urinalysis Basic - ( 08 Sep 2023 11:08 )    Color: x / Appearance: x / SG: x / pH: x  Gluc: 147 mg/dL / Ketone: x  / Bili: x / Urobili: x   Blood: x / Protein: x / Nitrite: x   Leuk Esterase: x / RBC: x / WBC x   Sq Epi: x / Non Sq Epi: x / Bacteria: x        RADIOLOGY & ADDITIONAL TESTS:    Imaging Personally Reviewed:    Consultant(s) Notes Reviewed:      Care Discussed with Consultants/Other Providers:

## 2023-09-09 NOTE — PROGRESS NOTE ADULT - PROBLEM SELECTOR PLAN 1
- acute on chronic cough - ?etiology - suspect it might be mild bronchitis, vs GERD, vs anxiety driven  - CXR is clear. Lung are clear to auscultation.   - No fever, wbc   - COVID negative x2  - C/w hycodan prn, tessalon pearles prn Duoneb INH, and Pulmicort INH   - c/w protonix  - also with back pain when she coughs- likely msk, area not ttp, check xray T spine, c/w lidocaine patch and tylenol

## 2023-09-09 NOTE — PROVIDER CONTACT NOTE (MEDICATION) - BACKGROUND
Pt is a 78 yr old female admitted with nausea and vomiting.   Pt currently getting Ns at 77 for hypotension.
Patient admitted for nausea and vomiting.

## 2023-09-10 PROCEDURE — 99233 SBSQ HOSP IP/OBS HIGH 50: CPT

## 2023-09-10 RX ADMIN — ATORVASTATIN CALCIUM 40 MILLIGRAM(S): 80 TABLET, FILM COATED ORAL at 21:38

## 2023-09-10 RX ADMIN — Medication 600 MILLIGRAM(S): at 06:02

## 2023-09-10 RX ADMIN — LIDOCAINE 1 PATCH: 4 CREAM TOPICAL at 11:55

## 2023-09-10 RX ADMIN — Medication 3 MILLILITER(S): at 23:04

## 2023-09-10 RX ADMIN — Medication 500000 UNIT(S): at 17:40

## 2023-09-10 RX ADMIN — Medication 325 MILLIGRAM(S): at 11:56

## 2023-09-10 RX ADMIN — Medication 3 MILLILITER(S): at 17:40

## 2023-09-10 RX ADMIN — Medication 600 MILLIGRAM(S): at 17:39

## 2023-09-10 RX ADMIN — Medication 1 SPRAY(S): at 18:22

## 2023-09-10 RX ADMIN — Medication 1 APPLICATION(S): at 13:16

## 2023-09-10 RX ADMIN — LIDOCAINE 1 PATCH: 4 CREAM TOPICAL at 23:10

## 2023-09-10 RX ADMIN — Medication 975 MILLIGRAM(S): at 18:53

## 2023-09-10 RX ADMIN — Medication 975 MILLIGRAM(S): at 06:03

## 2023-09-10 RX ADMIN — Medication 1 TABLET(S): at 09:13

## 2023-09-10 RX ADMIN — Medication 0.5 MILLIGRAM(S): at 06:02

## 2023-09-10 RX ADMIN — LIDOCAINE 1 PATCH: 4 CREAM TOPICAL at 19:30

## 2023-09-10 RX ADMIN — Medication 500000 UNIT(S): at 11:54

## 2023-09-10 RX ADMIN — Medication 1 APPLICATION(S): at 06:01

## 2023-09-10 RX ADMIN — Medication 975 MILLIGRAM(S): at 18:51

## 2023-09-10 RX ADMIN — Medication 1 APPLICATION(S): at 21:39

## 2023-09-10 RX ADMIN — ENOXAPARIN SODIUM 40 MILLIGRAM(S): 100 INJECTION SUBCUTANEOUS at 13:16

## 2023-09-10 RX ADMIN — Medication 1 SPRAY(S): at 06:02

## 2023-09-10 RX ADMIN — Medication 3 MILLILITER(S): at 11:55

## 2023-09-10 RX ADMIN — Medication 1 TABLET(S): at 17:39

## 2023-09-10 RX ADMIN — Medication 500000 UNIT(S): at 06:03

## 2023-09-10 RX ADMIN — PANTOPRAZOLE SODIUM 40 MILLIGRAM(S): 20 TABLET, DELAYED RELEASE ORAL at 06:02

## 2023-09-10 RX ADMIN — LORATADINE 10 MILLIGRAM(S): 10 TABLET ORAL at 11:56

## 2023-09-10 RX ADMIN — LIDOCAINE 1 PATCH: 4 CREAM TOPICAL at 00:27

## 2023-09-10 RX ADMIN — Medication 10 MILLIGRAM(S): at 11:56

## 2023-09-10 RX ADMIN — Medication 3 MILLILITER(S): at 06:03

## 2023-09-10 RX ADMIN — Medication 500000 UNIT(S): at 23:03

## 2023-09-10 NOTE — PROGRESS NOTE ADULT - PROBLEM SELECTOR PLAN 1
- acute on chronic cough - ?etiology - suspect it might be mild bronchitis, vs GERD, vs anxiety driven  - CXR is clear. Lung are clear to auscultation.   - No fever, wbc   - COVID negative x2  - C/w hycodan prn, tessalon pearles prn Duoneb INH, and Pulmicort INH   - c/w protonix  - also with back pain when she coughs- likely msk, area not ttp, xray T spine wnl, c/w lidocaine patch and tylenol

## 2023-09-10 NOTE — PROGRESS NOTE ADULT - SUBJECTIVE AND OBJECTIVE BOX
Patient is a 78y old  Female who presents with a chief complaint of nausea, vomiting (09 Sep 2023 14:18)      SUBJECTIVE / OVERNIGHT EVENTS: pt feels better, no cp, sob, abdominal pain, had bm     MEDICATIONS  (STANDING):  acetaminophen     Tablet .. 975 milliGRAM(s) Oral every 12 hours  albuterol/ipratropium for Nebulization 3 milliLiter(s) Nebulizer every 6 hours  amitriptyline 10 milliGRAM(s) Oral daily  atorvastatin 40 milliGRAM(s) Oral at bedtime  enoxaparin Injectable 40 milliGRAM(s) SubCutaneous every 24 hours  ferrous    sulfate 325 milliGRAM(s) Oral daily  fluticasone propionate 50 MICROgram(s)/spray Nasal Spray 1 Spray(s) Both Nostrils two times a day  guaiFENesin  milliGRAM(s) Oral every 12 hours  lactobacillus acidophilus 1 Tablet(s) Oral two times a day with meals  lidocaine   4% Patch 1 Patch Transdermal daily  loratadine 10 milliGRAM(s) Oral daily  losartan 50 milliGRAM(s) Oral daily  nystatin    Suspension 371885 Unit(s) Oral four times a day  pantoprazole    Tablet 40 milliGRAM(s) Oral before breakfast  triamcinolone 0.1% Cream 1 Application(s) Topical every 8 hours    MEDICATIONS  (PRN):  aluminum hydroxide/magnesium hydroxide/simethicone Suspension 30 milliLiter(s) Oral every 6 hours PRN Dyspepsia  benzonatate 100 milliGRAM(s) Oral three times a day PRN Cough  cyclobenzaprine 5 milliGRAM(s) Oral three times a day PRN Muscle Spasm  hydrocodone/homatropine Syrup 5 milliLiter(s) Oral four times a day PRN Cough  zaleplon 5 milliGRAM(s) Oral once PRN Insomnia        CAPILLARY BLOOD GLUCOSE        I&O's Summary    09 Sep 2023 07:01  -  10 Sep 2023 07:00  --------------------------------------------------------  IN: 700 mL / OUT: 1 mL / NET: 699 mL        PHYSICAL EXAM:  GENERAL: NAD, well-developed  HEAD:  Atraumatic, Normocephalic  EYES: EOMI, PERRLA, conjunctiva and sclera clear  NECK: Supple, No JVD  CHEST/LUNG: Clear to auscultation bilaterally; No wheeze  HEART: Regular rate and rhythm; No murmurs, rubs, or gallops  ABDOMEN: Soft, Nontender, Nondistended; Bowel sounds present  EXTREMITIES:  2+ Peripheral Pulses, No clubbing, cyanosis, or edema  PSYCH: AAOx3  NEUROLOGY: non-focal  SKIN: No rashes or lesions    LABS:                    RADIOLOGY & ADDITIONAL TESTS:    Imaging Personally Reviewed:    Consultant(s) Notes Reviewed:      Care Discussed with Consultants/Other Providers:

## 2023-09-11 ENCOUNTER — TRANSCRIPTION ENCOUNTER (OUTPATIENT)
Age: 78
End: 2023-09-11

## 2023-09-11 VITALS — DIASTOLIC BLOOD PRESSURE: 59 MMHG | SYSTOLIC BLOOD PRESSURE: 96 MMHG

## 2023-09-11 DIAGNOSIS — E43 UNSPECIFIED SEVERE PROTEIN-CALORIE MALNUTRITION: ICD-10-CM

## 2023-09-11 LAB
ANION GAP SERPL CALC-SCNC: 11 MMOL/L — SIGNIFICANT CHANGE UP (ref 5–17)
BUN SERPL-MCNC: 16 MG/DL — SIGNIFICANT CHANGE UP (ref 7–23)
CALCIUM SERPL-MCNC: 9.5 MG/DL — SIGNIFICANT CHANGE UP (ref 8.4–10.5)
CHLORIDE SERPL-SCNC: 104 MMOL/L — SIGNIFICANT CHANGE UP (ref 96–108)
CO2 SERPL-SCNC: 24 MMOL/L — SIGNIFICANT CHANGE UP (ref 22–31)
CREAT SERPL-MCNC: 0.77 MG/DL — SIGNIFICANT CHANGE UP (ref 0.5–1.3)
EGFR: 80 ML/MIN/1.73M2 — SIGNIFICANT CHANGE UP
GLUCOSE SERPL-MCNC: 105 MG/DL — HIGH (ref 70–99)
HCT VFR BLD CALC: 28.9 % — LOW (ref 34.5–45)
HGB BLD-MCNC: 8.9 G/DL — LOW (ref 11.5–15.5)
MCHC RBC-ENTMCNC: 24.6 PG — LOW (ref 27–34)
MCHC RBC-ENTMCNC: 30.8 GM/DL — LOW (ref 32–36)
MCV RBC AUTO: 79.8 FL — LOW (ref 80–100)
NRBC # BLD: 0 /100 WBCS — SIGNIFICANT CHANGE UP (ref 0–0)
PLATELET # BLD AUTO: 350 K/UL — SIGNIFICANT CHANGE UP (ref 150–400)
POTASSIUM SERPL-MCNC: 4.2 MMOL/L — SIGNIFICANT CHANGE UP (ref 3.5–5.3)
POTASSIUM SERPL-SCNC: 4.2 MMOL/L — SIGNIFICANT CHANGE UP (ref 3.5–5.3)
RBC # BLD: 3.62 M/UL — LOW (ref 3.8–5.2)
RBC # FLD: 14.8 % — HIGH (ref 10.3–14.5)
SODIUM SERPL-SCNC: 139 MMOL/L — SIGNIFICANT CHANGE UP (ref 135–145)
WBC # BLD: 7.64 K/UL — SIGNIFICANT CHANGE UP (ref 3.8–10.5)
WBC # FLD AUTO: 7.64 K/UL — SIGNIFICANT CHANGE UP (ref 3.8–10.5)

## 2023-09-11 PROCEDURE — 99239 HOSP IP/OBS DSCHRG MGMT >30: CPT

## 2023-09-11 RX ORDER — NYSTATIN 500MM UNIT
5 POWDER (EA) MISCELLANEOUS
Qty: 0 | Refills: 0 | DISCHARGE
Start: 2023-09-11

## 2023-09-11 RX ORDER — SODIUM SULFATE, POTASSIUM SULFATE, MAGNESIUM SULFATE 1.6; 3.13; 17.5 G/177ML; G/177ML; G/177ML
0 SOLUTION ORAL
Refills: 0 | DISCHARGE

## 2023-09-11 RX ORDER — LORATADINE 10 MG/1
1 TABLET ORAL
Qty: 0 | Refills: 0 | DISCHARGE
Start: 2023-09-11

## 2023-09-11 RX ORDER — FERROUS SULFATE 325(65) MG
1 TABLET ORAL
Qty: 0 | Refills: 0 | DISCHARGE
Start: 2023-09-11

## 2023-09-11 RX ORDER — IPRATROPIUM/ALBUTEROL SULFATE 18-103MCG
3 AEROSOL WITH ADAPTER (GRAM) INHALATION
Qty: 0 | Refills: 0 | DISCHARGE
Start: 2023-09-11

## 2023-09-11 RX ORDER — SODIUM CHLORIDE 9 MG/ML
500 INJECTION INTRAMUSCULAR; INTRAVENOUS; SUBCUTANEOUS ONCE
Refills: 0 | Status: COMPLETED | OUTPATIENT
Start: 2023-09-11 | End: 2023-09-11

## 2023-09-11 RX ORDER — CYCLOBENZAPRINE HYDROCHLORIDE 10 MG/1
1 TABLET, FILM COATED ORAL
Qty: 0 | Refills: 0 | DISCHARGE
Start: 2023-09-11

## 2023-09-11 RX ORDER — ACETAMINOPHEN 500 MG
3 TABLET ORAL
Qty: 0 | Refills: 0 | DISCHARGE
Start: 2023-09-11

## 2023-09-11 RX ORDER — FLUTICASONE PROPIONATE 50 MCG
1 SPRAY, SUSPENSION NASAL
Qty: 0 | Refills: 0 | DISCHARGE
Start: 2023-09-11

## 2023-09-11 RX ORDER — OLMESARTAN MEDOXOMIL 5 MG/1
1 TABLET, FILM COATED ORAL
Refills: 0 | DISCHARGE

## 2023-09-11 RX ADMIN — Medication 325 MILLIGRAM(S): at 14:00

## 2023-09-11 RX ADMIN — Medication 1 SPRAY(S): at 06:32

## 2023-09-11 RX ADMIN — LIDOCAINE 1 PATCH: 4 CREAM TOPICAL at 14:12

## 2023-09-11 RX ADMIN — Medication 10 MILLIGRAM(S): at 14:23

## 2023-09-11 RX ADMIN — CYCLOBENZAPRINE HYDROCHLORIDE 5 MILLIGRAM(S): 10 TABLET, FILM COATED ORAL at 13:59

## 2023-09-11 RX ADMIN — Medication 1 APPLICATION(S): at 06:33

## 2023-09-11 RX ADMIN — LOSARTAN POTASSIUM 50 MILLIGRAM(S): 100 TABLET, FILM COATED ORAL at 06:32

## 2023-09-11 RX ADMIN — Medication 500000 UNIT(S): at 06:31

## 2023-09-11 RX ADMIN — Medication 500000 UNIT(S): at 14:00

## 2023-09-11 RX ADMIN — Medication 1 TABLET(S): at 09:13

## 2023-09-11 RX ADMIN — PANTOPRAZOLE SODIUM 40 MILLIGRAM(S): 20 TABLET, DELAYED RELEASE ORAL at 06:32

## 2023-09-11 RX ADMIN — Medication 600 MILLIGRAM(S): at 06:32

## 2023-09-11 RX ADMIN — LORATADINE 10 MILLIGRAM(S): 10 TABLET ORAL at 14:00

## 2023-09-11 RX ADMIN — SODIUM CHLORIDE 500 MILLILITER(S): 9 INJECTION INTRAMUSCULAR; INTRAVENOUS; SUBCUTANEOUS at 11:31

## 2023-09-11 RX ADMIN — Medication 975 MILLIGRAM(S): at 06:33

## 2023-09-11 RX ADMIN — Medication 1 APPLICATION(S): at 14:13

## 2023-09-11 RX ADMIN — Medication 3 MILLILITER(S): at 06:31

## 2023-09-11 NOTE — DISCHARGE NOTE PROVIDER - DETAILS OF MALNUTRITION DIAGNOSIS/DIAGNOSES
This patient has been assessed with a concern for Malnutrition and was treated during this hospitalization for the following Nutrition diagnosis/diagnoses:     -  09/03/2023: Moderate protein-calorie malnutrition

## 2023-09-11 NOTE — DISCHARGE NOTE NURSING/CASE MANAGEMENT/SOCIAL WORK - PATIENT PORTAL LINK FT
You can access the FollowMyHealth Patient Portal offered by [facility] by registering at the following website: [email or web address]. By joining [facility]’s FollowMyHealth portal, you will also be able to view your health information using other applications (apps) compatible with our system.

## 2023-09-11 NOTE — PROGRESS NOTE ADULT - PROBLEM SELECTOR PROBLEM 5
Elevated liver function tests
Anemia
Elevated liver function tests

## 2023-09-11 NOTE — PROGRESS NOTE ADULT - SUBJECTIVE AND OBJECTIVE BOX
Patient is a 78y old  Female who presents with a chief complaint of nausea, vomiting (11 Sep 2023 12:10)      SUBJECTIVE / OVERNIGHT EVENTS: daughter at bedside, pt feels better, no cp, sob, abd pain     MEDICATIONS  (STANDING):  acetaminophen     Tablet .. 975 milliGRAM(s) Oral every 12 hours  albuterol/ipratropium for Nebulization 3 milliLiter(s) Nebulizer every 6 hours  amitriptyline 10 milliGRAM(s) Oral daily  atorvastatin 40 milliGRAM(s) Oral at bedtime  enoxaparin Injectable 40 milliGRAM(s) SubCutaneous every 24 hours  ferrous    sulfate 325 milliGRAM(s) Oral daily  fluticasone propionate 50 MICROgram(s)/spray Nasal Spray 1 Spray(s) Both Nostrils two times a day  guaiFENesin  milliGRAM(s) Oral every 12 hours  lactobacillus acidophilus 1 Tablet(s) Oral two times a day with meals  lidocaine   4% Patch 1 Patch Transdermal daily  loratadine 10 milliGRAM(s) Oral daily  nystatin    Suspension [id number] Unit(s) Oral four times a day  pantoprazole    Tablet 40 milliGRAM(s) Oral before breakfast  triamcinolone 0.1% Cream 1 Application(s) Topical every 8 hours    MEDICATIONS  (PRN):  aluminum hydroxide/magnesium hydroxide/simethicone Suspension 30 milliLiter(s) Oral every 6 hours PRN Dyspepsia  benzonatate 100 milliGRAM(s) Oral three times a day PRN Cough  cyclobenzaprine 5 milliGRAM(s) Oral three times a day PRN Muscle Spasm  hydrocodone/homatropine Syrup 5 milliLiter(s) Oral four times a day PRN Cough  zaleplon 5 milliGRAM(s) Oral once PRN Insomnia        CAPILLARY BLOOD GLUCOSE        I&O's Summary    10 Sep 2023 07:01  -  11 Sep 2023 07:00  --------------------------------------------------------  IN: 300 mL / OUT: 0 mL / NET: 300 mL        PHYSICAL EXAM:  GENERAL: NAD, well-developed, +oral thrush   HEAD:  Atraumatic, Normocephalic  EYES: conjunctiva and sclera clear  NECK: No JVD  CHEST/LUNG: Clear to auscultation bilaterally; No wheeze  HEART: Regular rate and rhythm; S1S2  ABDOMEN: Soft, Nontender, Nondistended; Bowel sounds present  EXTREMITIES:  2+ Peripheral Pulses, No clubbing, cyanosis, or edema  PSYCH: AAOx3  NEUROLOGY: non-focal  SKIN: No rashes or lesions    LABS:                        8.9    7.64  )-----------( 350      ( 11 Sep 2023 06:39 )             28.9     09-11    139  |  104  |  16  ----------------------------<  105<H>  4.2   |  24  |  0.77    Ca    9.5      11 Sep 2023 06:39            Urinalysis Basic - ( 11 Sep 2023 06:39 )    Color: x / Appearance: x / SG: x / pH: x  Gluc: 105 mg/dL / Ketone: x  / Bili: x / Urobili: x   Blood: x / Protein: x / Nitrite: x   Leuk Esterase: x / RBC: x / WBC x   Sq Epi: x / Non Sq Epi: x / Bacteria: x        RADIOLOGY & ADDITIONAL TESTS:    Imaging Personally Reviewed:    Consultant(s) Notes Reviewed:      Care Discussed with Consultants/Other Providers:

## 2023-09-11 NOTE — DISCHARGE NOTE NURSING/CASE MANAGEMENT/SOCIAL WORK - NSDCPEFALRISK_GEN_ALL_CORE
For information on Fall & Injury Prevention, visit: [email or web address] OR  [email or web address] OR  https://www.cdc.gov/steadi/patient.html

## 2023-09-11 NOTE — PROGRESS NOTE ADULT - PROBLEM SELECTOR PLAN 4
- Iron studies shows low Iron and Iron saturation  - C/w ferrous sulfate   - Concerning for also possible occult malignancy given age, weight loss and early satiety  - EGD as above   - Will need colonoscopy as outpt
hb 9.8, Iron studies shows low Iron and Iron saturation  - started on Iron tabs  - concerning for also possible occult malignancy given age, weight loss and early satiety   - f/u GI consult for EGD or C-Scope
- Blood cx coag negative staph   - Likely contaminant   - Repeat blood cx ngtd  - WBC wnl, afebrile- no sepsis
- Iron studies shows low Iron and Iron saturation  - started on Iron tabs  - concerning for also possible occult malignancy given age, weight loss and early satiety  - EGD 9/5
Presented with Na 128 and dehydration, seems to be hypovolemic hyponatremia  - Na 134, off D5W, now on IVF 1/2NS @ 60ml/hr
- Iron studies shows low Iron and Iron saturation  - C/w ferrous sulfate   - Concerning for also possible occult malignancy given age, weight loss and early satiety  - EGD as above   - Will need colonoscopy as outpt
- Blood cx coag negative staph   - Likely contaminant   - Repeat blood cx ngtd  - WBC wnl, afebrile- no sepsis
- Blood cx coag negative staph   - Likely contaminant   - Repeat blood cx ngtd  - WBC wnl, afebrile- no sepsis
hb 9.8, Iron studies shows low Iron and Iron saturation  - started on Iron tabs  - concerning for also possible occult malignancy given age, weight loss and early satiety   - f/u GI consult for EGD or C-Scope
- Blood cx coag negative staph   - Likely contaminant   - Repeat blood cx ngtd  - WBC wnl, afebrile- no sepsis

## 2023-09-11 NOTE — DISCHARGE NOTE PROVIDER - CARE PROVIDER_API CALL
Pastor Clemens  Anatomic/Clinical Pathology  [phone] [place] [address]  Phone: [phone]  Fax: [phone]  Follow Up Time: 1 week   Ellen Robles [facility]  Geriatric Medicine  [address] [place] [id number]  Phone: [phone]  Fax: [phone]  Follow Up Time:

## 2023-09-11 NOTE — PROGRESS NOTE ADULT - PROBLEM SELECTOR PLAN 9
Diet: clears  Dvt PPx: Lovenox 40 qD  Dispo: home PT     D/w daughter 9/9 - updated on full plan of care  Referral to see Geriatrician as outpt Dr. Ellen Robles    DC planning-home   HHA need to be set up
- Encourage po intake
Diet: clears  Dvt PPx: Lovenox 40 qD  Dispo: home PT     D/w daughter 9/8 - updated on full plan of care  Referral to see Geriatrician as outpt Dr. Ellen Robles
Diet: clears  Dvt PPx: Lovenox 40 qD  Dispo: home PT     D/w daughter 9/10 - updated on full plan of care  Referral to see Geriatrician as outpt Dr. Ellen Robles DC planning-home   HHA need to be set up- likely dc on monday
Diet: clears  Dvt PPx: Lovenox 40 qD  Dispo: home PT     D/w daughter 9/7 updated on full plan of care  Referral to see Geriatrician as outpt Dr. Ellen Robles

## 2023-09-11 NOTE — PROGRESS NOTE ADULT - PROVIDER SPECIALTY LIST ADULT
Gastroenterology
Gastroenterology
Internal Medicine
Hospitalist
Hospitalist
Internal Medicine
Hospitalist
Internal Medicine
Hospitalist

## 2023-09-11 NOTE — PROGRESS NOTE ADULT - PROBLEM SELECTOR PROBLEM 1
Cough
Colitis
Cough
Cough
Gram positive bacterial infection
Colitis
Colitis
Nausea & vomiting
Colitis
Cough

## 2023-09-11 NOTE — PROGRESS NOTE ADULT - PROBLEM SELECTOR PROBLEM 7
Seasonal allergies
Anxiety and depression
Anxiety and depression
Seasonal allergies
Anemia
Anxiety and depression
Seasonal allergies
Anxiety and depression
Anxiety and depression
Seasonal allergies

## 2023-09-11 NOTE — PROGRESS NOTE ADULT - PROBLEM SELECTOR PROBLEM 4
Anemia
Anemia
Hyponatremia
Anemia
Anemia
Gram positive bacterial infection
Gram positive bacterial infection
Anemia
Gram positive bacterial infection
Gram positive bacterial infection

## 2023-09-11 NOTE — DISCHARGE NOTE PROVIDER - NSDCCPCAREPLAN_GEN_ALL_CORE_FT
PRINCIPAL DISCHARGE DIAGNOSIS  Diagnosis: Nausea & vomiting  Assessment and Plan of Treatment: Improved      SECONDARY DISCHARGE DIAGNOSES  Diagnosis: Unintentional weight loss  Assessment and Plan of Treatment: given unintentional weight loss and anemia, you will need an outpatient colon cancer screening colonoscopy      Diagnosis: Anemia  Assessment and Plan of Treatment: Continue iron supplements  Eat iron and protein rich foods including: meat, dark leafy green vegetables, and beans.  Limit caffeine.  Notify your doctor if you experience heartburn, constipation, diarrhea, nausea/vomiting, dizziness or are feeling extremely tired.  Seek immediate care or call 911 if you experience:  shortness of breath even at rest, you have blood in your bowel movement or vomit, if you are too dizzy to stand up    Diagnosis: Hyponatremia  Assessment and Plan of Treatment: HOME CARE INSTRUCTIONS  You sodium level was low.  Now improved, normal   Only take medicines as directed by your caregiver. Many medicines can make hyponatremia worse. Discuss all your medicines with your caregiver.  Carefully follow any recommended diet, including any fluid restrictions.  You may be asked to repeat lab tests. Follow these directions.  Avoid alcohol and recreational drugs.  SEEK MEDICAL CARE IF:  You develop worsening nausea, fatigue, headache, confusion, or weakness.  Your original hyponatremia symptoms return.  You have problems following the recommended diet.   SEEK IMMEDIATE MEDICAL CARE IF:  You have a seizure.  You faint.  You have ongoing diarrhea or vomiting

## 2023-09-11 NOTE — PROGRESS NOTE ADULT - REASON FOR ADMISSION
nausea, vomiting

## 2023-09-11 NOTE — PROGRESS NOTE ADULT - PROBLEM SELECTOR PLAN 3
Patient presenting with n/v for 2 days and inability to tolerate po intake. Has dysphagia symptoms and states she also has acid reflux. CT also showing wall thickening of colon, concerning for colitis. May be related to recent viral illness (+enterovirus on 8/10)  - GI plan noted- clears today, NPO p MN for EGD in am  - Zofran PRN for nausea  - c/w ppi for now
Patient presenting with n/v for 2 days and inability to tolerate po intake. Has dysphagia symptoms and states she also has acid reflux. CT also showing wall thickening of colon, concerning for colitis. May be related to recent viral illness (+enterovirus on 8/10)  - GI plan noted- patient may need inpatient EGD +/- C-Scope  - Zofran PRN for nausea  - c/w ppi for now  - Recent CTH, neck looks ok
- Patient reports 2 episodes of non-bloody watery diarrhea over the last few days which has since resolved   - CT also showing wall thickening of colon  - Blood c/s shows GPC in clusters, coag negative staph, likely contaminant    - Pt afebrile, no leukocytosis   - Sp IV ceftriaxone and flagyl total 7 day course    - GI PCR and stool culture negative
- Blood cx coag negative staph   - Likely contaminant   - Repeat blood cx ngtd  - WBC wnl, afebrile- no sepsis
- Patient reports 2 episodes of non-bloody watery diarrhea over the last few days which has since resolved   - CT also showing wall thickening of colon  - Blood c/s shows GPC in clusters, coag negative staph, likely contaminant    - Pt afebrile, no leukocytosis   - Sp IV ceftriaxone and flagyl total 7 day course    - GI PCR and stool culture negative
Patient presenting with n/v for 2 days and inability to tolerate po intake. Has dysphagia symptoms and states she also has acid reflux. CT also showing wall thickening of colon, concerning for colitis. May be related to recent viral illness (+enterovirus on 8/10)  - GI consulted, f/u recs. patient may need inpatient EGD +/- C-Scope  - Obtain speech and swallow  - Zofran PRN for nausea  - c/w ppi for now
- Patient presenting with n/v for 2 days and inability to tolerate po intake.   - May be related to recent viral illness (+enterovirus on 8/10)  - Also was on steroids for recent viral illness/enterorhinovirus   - +thrush on exam   - C/w nystatin suspension   - C/w Zofran PRN for nausea  - C/w protonix  - C/w maalox prn   - S/p EGD 9/5 with Normal esophagus, Hiatal hernia, Erythematous mucosa in the antrum, Normal duodenal bulb  - Follow up path from biopsies   - Pending gastric emptying study   - SS eval   - ENT eval  - Outpatient referral for esophageal manometry.  - Will need outpt colonoscopy   - GI eval
- Patient presenting with n/v for 2 days and inability to tolerate po intake. Has dysphagia symptoms and states she also has acid reflux. CT also showing wall thickening of colon, concerning for colitis. May be related to recent viral illness (+enterovirus on 8/10)  - Also was on steroids for recent viral illness   - +thrush on exam   - EGD 9/5  - Zofran PRN for nausea  - c/w ppi for now
- Patient reports 2 episodes of non-bloody watery diarrhea over the last few days which has since resolved   - CT also showing wall thickening of colon  - Blood c/s shows GPC in clusters, coag negative staph, likely contaminant    - Pt afebrile, no leukocytosis   - Sp IV ceftriaxone and flagyl total 7 day course    - GI PCR and stool culture negative
- Patient reports 2 episodes of non-bloody watery diarrhea over the last few days which has since resolved   - CT also showing wall thickening of colon  - Blood c/s shows GPC in clusters, coag negative staph, likely contaminant    - Pt afebrile, no leukocytosis   - Sp IV ceftriaxone and flagyl total 7 day course    - GI PCR and stool culture negative

## 2023-09-11 NOTE — DISCHARGE NOTE NURSING/CASE MANAGEMENT/SOCIAL WORK - NSDCPNINST_GEN_ALL_CORE
Go for follow up appointments. Any fever, pain, headache, bleeding, notify doctor and if severe present to ER.

## 2023-09-11 NOTE — PROGRESS NOTE ADULT - PROBLEM SELECTOR PROBLEM 9
Need for prophylactic measure
Need for prophylactic measure
Severe protein-calorie malnutrition
Need for prophylactic measure
Need for prophylactic measure

## 2023-09-11 NOTE — DISCHARGE NOTE PROVIDER - HOSPITAL COURSE
HPI:  Patient is a 78yof w/ hx of HTN, HLD, Depression, Anxiety, ?Dementia who presents with nausea vomiting for the past 2 days. States that she has been having few episodes of nbnb vomiting for the past 2 days and felt chills but no true fever. Patient states she is unable to tolerate any solid foods in over 2 days because it causes nausea. States she also has been having decreaesd appetite for 4-5 months, and has lost 10lbs since june. States she also has been having difficulty swallowing solid foods and ocassionally feels acid reflux. Around 5 months ago, she has an episode of seeing bright red blood on the toilet paper when wiping. She went to [facility] 2 weeks ago for dizziness and was discharged with meclizine. About one month ago she had green sputum production and was discharged with medrol dose pack and azithromycin which she finished. Also endorses mild headache on her forehead area. Denies any abdominal pain, constipation (has been having regular bms), fatigue, weakness, diarrhea, pain with swallowing.  (01 Sep 2023 10:07)    Hospital Course:  Pt admitted with Nausea / Vomiting -CT w/ Colitis, advancing diet, s/p cefepime, flagyl ; GI PCR/stool cx neg, blood cx neg, Hyponatremia , improved to 134 / inc LFT's - NS @ 55ml /hr, and with       Headache w/ nausea- CT H neg.  P was seen by S&S, recommended regular diet.  s/p EGD on 9/5. Was seen by PT: recommended Home PT, rolling walker.  Patient has been medically cleared for discharge as per Dr. Villalba. Patient has been given appropriate discharge instructions including medication regimen, access site management and follow up. Medications that patient needs refills on (+/- new medications) have been e-prescribed to preferred pharmacy. Patient will f/u with PCP in 1-2 weeks for further management.        Important Medication Changes and Reason:    Active or Pending Issues Requiring Follow-up:  given unintentional weight loss and anemia, patient will need an outpatient colon cancer screening colonoscopy      Advanced Directives:   [ x] Full code  [ ] DNR  [ ] Hospice    Discharge Diagnoses:  Nausea / Vomiting -CT w/ Colitis  Unintentional weight loss  Hyponatremia    Headache w/ nausea- CT H neg   Anemia

## 2023-09-11 NOTE — DISCHARGE NOTE PROVIDER - NSDCFUADDAPPT_GEN_ALL_CORE_FT
APPTS ARE READY TO BE MADE: [X ] YES    Best Family or Patient Contact (if needed):    Additional Information about above appointments (if needed):    1: given unintentional weight loss and anemia, you will need an outpatient colon cancer screening colonoscopy    2:   3:     Other comments or requests:    APPTS ARE READY TO BE MADE: [X ] YES    Best Family or Patient Contact (if needed):    Additional Information about above appointments (if needed):    1: given unintentional weight loss and anemia, you will need an outpatient colon cancer screening colonoscopy    2:   3:     Other comments or requests:   Patients daughter declined scheduling assistance as she advised she arranges her mother's appointments on her own.

## 2023-09-11 NOTE — PROGRESS NOTE ADULT - PROBLEM SELECTOR PROBLEM 2
Nausea & vomiting
Colitis
Colitis
Gram positive bacterial infection
Nausea & vomiting
Gram positive bacterial infection
Nausea & vomiting
Nausea & vomiting

## 2023-09-11 NOTE — PROGRESS NOTE ADULT - PROBLEM SELECTOR PROBLEM 3
Colitis
Nausea & vomiting
Gram positive bacterial infection
Nausea & vomiting
Nausea & vomiting
Colitis
Nausea & vomiting
Nausea & vomiting
Colitis
Colitis

## 2023-09-11 NOTE — DISCHARGE NOTE PROVIDER - NSDCADMDATE_GEN_ALL_CORE_FT
01-Sep-2023 04:53 HISTORY OF PRESENT ILLNESS:  The patient is a 65 year old female coming today for discussion regarding her chronic medical issues. She does have type 2 diabetes which is currently well controlled. She has had a recent eye exam and denies any numbness or tingling in her hands or feet.    Patient suffers from chronic pain. She does have degenerative disc disease and uses hydrocodone sparingly. She tries to keep herself active.    Past Medical History:   Diagnosis Date   • Anxiety    • Bulging lumbar disc    • Chronic back pain    • Constipation    • Degenerative disc disease, lumbar    • Diabetes mellitus (CMS/HCC)    • Facet arthropathy    • Insomnia    • Osteoarthritis     knees and lumbar spine   • Personal history of colonic polyps 12/28/2011   • Rectal bleeding    • Torn rotator cuff        ALLERGIES:  No Known Allergies    Current Outpatient Prescriptions   Medication Sig   • HYDROcodone-acetaminophen (NORCO) 5-325 MG per tablet Take 1 tablet by mouth every 6 hours as needed for Pain.   • atorvastatin (LIPITOR) 10 MG tablet Take 1 tablet by mouth daily.   • losartan (COZAAR) 25 MG tablet Take 1 tablet by mouth daily.   • metformin (GLUCOPHAGE-XR) 500 MG 24 hr tablet Take 2 tablets by mouth once daily in the morning with breakfast   • SOFTCLIX LANCETS Misc USE ONE  ONCE DAILY   • ACCU-CHEK DHRUV PLUS test strip USE ONE STRIP ONCE DAILY   • cholecalciferol (VITAMIN D3) 1000 UNITS tablet Take 2,000 Units by mouth daily.   • DISPENSE Patient's insurance will cover Roche, smart view, avivia plus, verio, one touch verio IQ meter. Patient is to check sugars daily. DX:E11.9   • Omega-3 Fatty Acids (FISH OIL) 1000 MG capsule Take 2 g by mouth daily.   • aspirin 81 MG tablet Take 1 tablet by mouth daily.     No current facility-administered medications for this visit.        Social History   Substance Use Topics   • Smoking status: Former Smoker     Years: 19.00     Types: Cigarettes   • Smokeless tobacco: Never Used   •  Alcohol use Yes      Comment: social       Family History   Problem Relation Age of Onset   • Cancer     • Heart disease     • High blood pressure     • Diabetes     • Diabetes Mother    • Dementia Mother    • Cancer Maternal Aunt        REVIEW OF SYSTEMS:  The rest of the cardiovascular, respiratory, gastrointestinal, neurologic, urinary and musculoskeletal and all other systems are reviewed and are negative.    PHYSICAL EXAM:  VITAL SIGNS:   Visit Vitals  /60   Pulse 84   Wt 91.4 kg   LMP 12/22/2004   BMI 31.54 kg/m²     GENERAL: Healthy, alert and in no distress. Affect is varied and appropriate.  LUNGS: Clear to auscultation and percussion.   CARDIOVASCULAR: Regular rate and rhythm and no murmurs, clicks, or gallops.   Diabetic Foot Exam Documentation     Normal Bilateral Foot Exam: Skin integrity is normal. Dorsalis pedis and posterior tibial pulses are present.  Pressure sensation using the [place] monofilament is present.    ASSESSMENT:   1. Type 2 diabetes mellitus without complication, without long-term current use of insulin (CMS/[facility])    2. Chronic midline low back pain without sciatica        PLAN:  We will check labs today. She will continue her current medications. See me in 6 months.  Orders Placed This Encounter   • Comprehensive Metabolic Panel   • HYDROcodone-acetaminophen (NORCO) 5-325 MG per tablet

## 2023-09-11 NOTE — PROGRESS NOTE ADULT - PROBLEM SELECTOR PROBLEM 6
Seasonal allergies
Elevated liver function tests
Seasonal allergies
Elevated liver function tests
Seasonal allergies
Elevated liver function tests
Elevated liver function tests
Seasonal allergies
Anxiety and depression
Seasonal allergies

## 2023-09-11 NOTE — DISCHARGE NOTE NURSING/CASE MANAGEMENT/SOCIAL WORK - NSDCFUADDAPPT_GEN_ALL_CORE_FT
APPTS ARE READY TO BE MADE: [X ] YES    Best Family or Patient Contact (if needed):    Additional Information about above appointments (if needed):    1: given unintentional weight loss and anemia, you will need an outpatient colon cancer screening colonoscopy    2:   3:     Other comments or requests:

## 2023-09-11 NOTE — PROGRESS NOTE ADULT - PROBLEM SELECTOR PLAN 1
- acute on chronic cough - ?etiology - suspect it might be mild bronchitis, vs GERD, vs anxiety driven  - CXR is clear. Lung are clear to auscultation.   - No fever, wbc   - COVID negative x2  - C/w hycodan prn, tessalon pearles prn Duoneb INH, and Pulmicort INH   - c/w protonix  - also with back pain when she coughs- likely msk, area not ttp, xray T spine wnl, c/w lidocaine patch and tylenol  - resolved

## 2023-09-11 NOTE — PROGRESS NOTE ADULT - PROBLEM SELECTOR PLAN 10
Diet: regular  Dvt PPx: Lovenox 40 qD  Dispo: home PT     D/w daughter 9/10 - updated on full plan of care  Referral to see Geriatrician as outpt Dr. Ellen Robles DC planning-home   HHA need to be set up- likely dc on monday

## 2023-09-11 NOTE — DISCHARGE NOTE PROVIDER - NSDCMRMEDTOKEN_GEN_ALL_CORE_FT
amitriptyline 10 mg oral tablet: 1 tab(s) orally once a day  Benicar 20 mg oral tablet: 1 tab(s) orally once a day  Dexilant 60 mg oral delayed release capsule: 1 cap(s) orally once a day  dicyclomine 20 mg oral tablet: 1 tab(s) orally 4 times a day as needed  Note:Pt has not started yet  ergocalciferol 1.25 mg (50,000 intl units) oral capsule: 1 cap(s) orally once a week on fridays  Linzess 145 mcg oral capsule: 1 cap(s) orally once a day  Lipitor 40 mg oral tablet: 1 tab(s) orally once a day  magnesium oxide 400 mg oral tablet: 1 tab(s) orally once a day  magnesium sulfate/potassium sulfate/sodium sulfate 1.6 g-3.13 g-17.5 g/177 mL oral liquid: NotePt has not started yet  Namzaric 28 mg-10 mg oral capsule, extended release: 1 cap(s) orally once a day  Pepcid 40 mg oral tablet: 1 tab(s) orally once a day NotePt has not started yet  Rexulti 0.5 mg oral tablet: 1 tab(s) orally once a day NotePt has not started yet  Nataly Yip: Nataly Yip  sucralfate 1 g oral tablet: 1 tab(s) orally once a day  Thera M oral tablet: 1 tab(s) orally once a day  Trintellix 20 mg oral tablet: 1 tab(s) orally once a day NotePt has not started yet  Zofran 8 mg oral tablet: 1 tab(s) orally every 8 hours as needed  NotePt has not started yet   acetaminophen 325 mg oral tablet: 3 tab(s) orally every 12 hours  amitriptyline 10 mg oral tablet: 1 tab(s) orally once a day  benzonatate 100 mg oral capsule: 1 cap(s) orally 3 times a day As needed Cough  cyclobenzaprine 5 mg oral tablet: 1 tab(s) orally 3 times a day As needed Muscle Spasm  Dexilant 60 mg oral delayed release capsule: 1 cap(s) orally once a day  dicyclomine 20 mg oral tablet: 1 tab(s) orally 4 times a day as needed  Note:Pt has not started yet  ergocalciferol 1.25 mg (50,000 intl units) oral capsule: 1 cap(s) orally once a week on fridays  ferrous sulfate 325 mg (65 mg elemental iron) oral tablet: 1 tab(s) orally once a day  fluticasone 50 mcg/inh nasal spray: 1 spray(s) nasal 2 times a day  guaiFENesin 600 mg oral tablet, extended release: 1 tab(s) orally every 12 hours  Home Physical Therapy: Evaluate and treat  ipratropium-albuterol 0.5 mg-2.5 mg/3 mL inhalation solution: 3 milliliter(s) inhaled every 6 hours  Linzess 145 mcg oral capsule: 1 cap(s) orally once a day  Lipitor 40 mg oral tablet: 1 tab(s) orally once a day  loratadine 10 mg oral tablet: 1 tab(s) orally once a day  magnesium oxide 400 mg oral tablet: 1 tab(s) orally once a day  Namzaric 28 mg-10 mg oral capsule, extended release: 1 cap(s) orally once a day  Nebulizer: Use every 6 hours as needed.  nystatin 100,000 units/mL oral suspension: 5 milliliter(s) orally 4 times a day  Pepcid 40 mg oral tablet: 1 tab(s) orally once a day NotePt has not started yet  Rexulti 0.5 mg oral tablet: 1 tab(s) orally once a day NotePt has not started yet  sucralfate 1 g oral tablet: 1 tab(s) orally once a day  Thera M oral tablet: 1 tab(s) orally once a day  triamcinolone 0.1% topical cream: 1 Apply topically to affected area every 8 hours  Trintellix 20 mg oral tablet: 1 tab(s) orally once a day NotePt has not started yet  Zofran 8 mg oral tablet: 1 tab(s) orally every 8 hours as needed  NotePt has not started yet   acetaminophen 325 mg oral tablet: 3 tab(s) orally every 12 hours  amitriptyline 10 mg oral tablet: 1 tab(s) orally once a day  benzonatate 100 mg oral capsule: 1 cap(s) orally 3 times a day As needed Cough  benzonatate 100 mg oral capsule: 1 cap(s) orally every 8 hours as needed for  cough  cyclobenzaprine 5 mg oral tablet: 1 tab(s) orally 3 times a day As needed Muscle Spasm  cyclobenzaprine 5 mg oral tablet: 1 tab(s) orally 3 times a day as needed for  muscle spasm DO NOT DRIVE OR OPERATE MACHINERY WHILE TAKING THIS MEDICATION.  Dexilant 60 mg oral delayed release capsule: 1 cap(s) orally once a day  ergocalciferol 1.25 mg (50,000 intl units) oral capsule: 1 cap(s) orally once a week on fridays  ferrous sulfate 325 mg (65 mg elemental iron) oral tablet: 1 tab(s) orally once a day  fluticasone 50 mcg/inh nasal spray: 1 spray(s) nasal 2 times a day  guaiFENesin 600 mg oral tablet, extended release: 1 tab(s) orally every 12 hours  Home Physical Therapy: Evaluate and treat  ipratropium-albuterol 0.5 mg-2.5 mg/3 mL inhalation solution: 3 milliliter(s) inhaled every 6 hours as needed for  bronchospasm  Linzess 145 mcg oral capsule: 1 cap(s) orally once a day  Lipitor 40 mg oral tablet: 1 tab(s) orally once a day  loratadine 10 mg oral tablet: 1 tab(s) orally once a day  magnesium oxide 400 mg oral tablet: 1 tab(s) orally once a day  Namzaric 28 mg-10 mg oral capsule, extended release: 1 cap(s) orally once a day  Nebulizer: Use every 6 hours as needed.  nystatin 100,000 units/mL oral suspension: 5 milliliter(s) orally every 6 hours Swish and spit  nystatin 100,000 units/mL oral suspension: 5 milliliter(s) orally 4 times a day  Pepcid 40 mg oral tablet: 1 tab(s) orally once a day NotePt has not started yet  Rexulti 0.5 mg oral tablet: 1 tab(s) orally once a day NotePt has not started yet  Rolling Phi: Nataly Yip  sucralfate 1 g oral tablet: 1 tab(s) orally once a day  Thera M oral tablet: 1 tab(s) orally once a day  triamcinolone 0.1% topical cream: 1 Apply topically to affected area every 8 hours  triamcinolone 0.1% topical ointment: Apply topically to affected area 3 times a day  Trintellix 20 mg oral tablet: 1 tab(s) orally once a day NotePt has not started yet  Zofran 8 mg oral tablet: 1 tab(s) orally every 8 hours as needed  NotePt has not started yet

## 2023-09-11 NOTE — PROGRESS NOTE ADULT - PROBLEM SELECTOR PLAN 7
- C/w home amitriptyline  - Hold off recently started on rexulti and trintellix  - Can be started oupatient when she sees her PCP
- Started on Flonase nasal spray and Claritin
- Started on Flonase nasal spray and Claritin
c/w home amitriptyline  - Hold off recently started on rexulti and trintellix  - Can be started oupatient when she sees her PCP
- C/w home amitriptyline  - Hold off recently started on rexulti and trintellix  - Can be started oupatient when she sees her PCP
- c/w home amitriptyline  - Hold off recently started on rexulti and trintellix  - Can be started oupatient when she sees her PCP
hb 9.8 with mcv 76.4, Iron studies shows low Iron and Iron saturation  - started on Iron tabs  - concerning for also possible occult malignancy given age, weight loss and early satiety   - f/u GI consult for EGD or C-Scope
- Started on Flonase nasal spray and Claritin
- Started on Flonase nasal spray and Claritin
c/w home amitriptyline  - Hold off recently started on rexulti and trintellix  - Can be started oupatient when she sees her PCP

## 2023-09-11 NOTE — PROGRESS NOTE ADULT - NUTRITIONAL ASSESSMENT
This patient has been assessed with a concern for Malnutrition and has been determined to have a diagnosis/diagnoses of Moderate protein-calorie malnutrition.    This patient is being managed with:   Diet NPO after Midnight-     NPO Start Date: 04-Sep-2023   NPO Start Time: 23:59  Except Medications  Entered: Sep  4 2023  1:51PM    Diet Clear Liquid-  Entered: Sep  4 2023  8:44AM  
This patient has been assessed with a concern for Malnutrition and has been determined to have a diagnosis/diagnoses of Moderate protein-calorie malnutrition.    This patient is being managed with:   Diet Clear Liquid-  Entered: Sep  4 2023  8:44AM  
This patient has been assessed with a concern for Malnutrition and has been determined to have a diagnosis/diagnoses of Moderate protein-calorie malnutrition.    This patient is being managed with:   Diet Regular-  Entered: Sep  7 2023  3:30PM  
This patient has been assessed with a concern for Malnutrition and has been determined to have a diagnosis/diagnoses of Moderate protein-calorie malnutrition.    This patient is being managed with:   Diet Clear Liquid-  Entered: Sep  6 2023  1:49PM  
This patient has been assessed with a concern for Malnutrition and has been determined to have a diagnosis/diagnoses of Moderate protein-calorie malnutrition.    This patient is being managed with:   Diet Regular-  Entered: Sep  7 2023  3:30PM  
This patient has been assessed with a concern for Malnutrition and has been determined to have a diagnosis/diagnoses of Moderate protein-calorie malnutrition.    This patient is being managed with:   Diet Soft and Bite Sized-  Supplement Feeding Modality:  Oral  Ensure Enlive Cans or Servings Per Day:  2       Frequency:  Two Times a day  Entered: Sep  6 2023 10:01AM

## 2023-09-12 RX ORDER — CYCLOBENZAPRINE HYDROCHLORIDE 10 MG/1
1 TABLET, FILM COATED ORAL
Qty: 45 | Refills: 0
Start: 2023-09-12 | End: 2023-09-26

## 2023-09-12 RX ORDER — NYSTATIN 500MM UNIT
5 POWDER (EA) MISCELLANEOUS
Qty: 200 | Refills: 0
Start: 2023-09-12 | End: 2023-09-21

## 2023-09-12 RX ORDER — IPRATROPIUM/ALBUTEROL SULFATE 18-103MCG
3 AEROSOL WITH ADAPTER (GRAM) INHALATION
Qty: 120 | Refills: 0
Start: 2023-09-12 | End: 2023-09-21

## 2023-09-12 RX ORDER — FERROUS SULFATE 325(65) MG
1 TABLET ORAL
Qty: 30 | Refills: 0
Start: 2023-09-12 | End: 2023-10-11

## 2023-11-13 PROCEDURE — 74177 CT ABD & PELVIS W/CONTRAST: CPT

## 2023-11-13 PROCEDURE — 99285 EMERGENCY DEPT VISIT HI MDM: CPT | Mod: 25

## 2023-11-13 PROCEDURE — 82728 ASSAY OF FERRITIN: CPT

## 2023-11-13 PROCEDURE — 84100 ASSAY OF PHOSPHORUS: CPT

## 2023-11-13 PROCEDURE — 80074 ACUTE HEPATITIS PANEL: CPT

## 2023-11-13 PROCEDURE — 83540 ASSAY OF IRON: CPT

## 2023-11-13 PROCEDURE — 83550 IRON BINDING TEST: CPT

## 2023-11-13 PROCEDURE — 88305 TISSUE EXAM BY PATHOLOGIST: CPT

## 2023-11-13 PROCEDURE — 84484 ASSAY OF TROPONIN QUANT: CPT

## 2023-11-13 PROCEDURE — 83690 ASSAY OF LIPASE: CPT

## 2023-11-13 PROCEDURE — 94640 AIRWAY INHALATION TREATMENT: CPT

## 2023-11-13 PROCEDURE — 97161 PT EVAL LOW COMPLEX 20 MIN: CPT

## 2023-11-13 PROCEDURE — 88341 IMHCHEM/IMCYTCHM EA ADD ANTB: CPT

## 2023-11-13 PROCEDURE — 92610 EVALUATE SWALLOWING FUNCTION: CPT

## 2023-11-13 PROCEDURE — 80053 COMPREHEN METABOLIC PANEL: CPT

## 2023-11-13 PROCEDURE — 82570 ASSAY OF URINE CREATININE: CPT

## 2023-11-13 PROCEDURE — 85027 COMPLETE CBC AUTOMATED: CPT

## 2023-11-13 PROCEDURE — 81001 URINALYSIS AUTO W/SCOPE: CPT

## 2023-11-13 PROCEDURE — 85018 HEMOGLOBIN: CPT

## 2023-11-13 PROCEDURE — 76705 ECHO EXAM OF ABDOMEN: CPT

## 2023-11-13 PROCEDURE — 84295 ASSAY OF SERUM SODIUM: CPT

## 2023-11-13 PROCEDURE — 82435 ASSAY OF BLOOD CHLORIDE: CPT

## 2023-11-13 PROCEDURE — 82746 ASSAY OF FOLIC ACID SERUM: CPT

## 2023-11-13 PROCEDURE — 97530 THERAPEUTIC ACTIVITIES: CPT

## 2023-11-13 PROCEDURE — 84132 ASSAY OF SERUM POTASSIUM: CPT

## 2023-11-13 PROCEDURE — 70450 CT HEAD/BRAIN W/O DYE: CPT

## 2023-11-13 PROCEDURE — 83605 ASSAY OF LACTIC ACID: CPT

## 2023-11-13 PROCEDURE — 78264 GASTRIC EMPTYING IMG STUDY: CPT

## 2023-11-13 PROCEDURE — 96374 THER/PROPH/DIAG INJ IV PUSH: CPT

## 2023-11-13 PROCEDURE — A9541: CPT

## 2023-11-13 PROCEDURE — 97535 SELF CARE MNGMENT TRAINING: CPT

## 2023-11-13 PROCEDURE — 97116 GAIT TRAINING THERAPY: CPT

## 2023-11-13 PROCEDURE — 83935 ASSAY OF URINE OSMOLALITY: CPT

## 2023-11-13 PROCEDURE — 87637 SARSCOV2&INF A&B&RSV AMP PRB: CPT

## 2023-11-13 PROCEDURE — 87046 STOOL CULTR AEROBIC BACT EA: CPT

## 2023-11-13 PROCEDURE — 85730 THROMBOPLASTIN TIME PARTIAL: CPT

## 2023-11-13 PROCEDURE — 82803 BLOOD GASES ANY COMBINATION: CPT

## 2023-11-13 PROCEDURE — 82330 ASSAY OF CALCIUM: CPT

## 2023-11-13 PROCEDURE — 83880 ASSAY OF NATRIURETIC PEPTIDE: CPT

## 2023-11-13 PROCEDURE — 82947 ASSAY GLUCOSE BLOOD QUANT: CPT

## 2023-11-13 PROCEDURE — 71260 CT THORAX DX C+: CPT | Mod: MA

## 2023-11-13 PROCEDURE — 87150 DNA/RNA AMPLIFIED PROBE: CPT

## 2023-11-13 PROCEDURE — 85014 HEMATOCRIT: CPT

## 2023-11-13 PROCEDURE — 87040 BLOOD CULTURE FOR BACTERIA: CPT

## 2023-11-13 PROCEDURE — 82607 VITAMIN B-12: CPT

## 2023-11-13 PROCEDURE — 87507 IADNA-DNA/RNA PROBE TQ 12-25: CPT

## 2023-11-13 PROCEDURE — 87635 SARS-COV-2 COVID-19 AMP PRB: CPT

## 2023-11-13 PROCEDURE — 96375 TX/PRO/DX INJ NEW DRUG ADDON: CPT

## 2023-11-13 PROCEDURE — 71045 X-RAY EXAM CHEST 1 VIEW: CPT

## 2023-11-13 PROCEDURE — 85610 PROTHROMBIN TIME: CPT

## 2023-11-13 PROCEDURE — 97166 OT EVAL MOD COMPLEX 45 MIN: CPT

## 2023-11-13 PROCEDURE — 87077 CULTURE AEROBIC IDENTIFY: CPT

## 2023-11-13 PROCEDURE — 84300 ASSAY OF URINE SODIUM: CPT

## 2023-11-13 PROCEDURE — 84540 ASSAY OF URINE/UREA-N: CPT

## 2023-11-13 PROCEDURE — 72070 X-RAY EXAM THORAC SPINE 2VWS: CPT

## 2023-11-13 PROCEDURE — 83735 ASSAY OF MAGNESIUM: CPT

## 2023-11-13 PROCEDURE — 82962 GLUCOSE BLOOD TEST: CPT

## 2023-11-13 PROCEDURE — 82436 ASSAY OF URINE CHLORIDE: CPT

## 2023-11-13 PROCEDURE — 85025 COMPLETE CBC W/AUTO DIFF WBC: CPT

## 2023-11-13 PROCEDURE — 0225U NFCT DS DNA&RNA 21 SARSCOV2: CPT

## 2023-11-13 PROCEDURE — 83930 ASSAY OF BLOOD OSMOLALITY: CPT

## 2023-11-13 PROCEDURE — 36415 COLL VENOUS BLD VENIPUNCTURE: CPT

## 2023-11-13 PROCEDURE — 80202 ASSAY OF VANCOMYCIN: CPT

## 2023-11-13 PROCEDURE — 87045 FECES CULTURE AEROBIC BACT: CPT

## 2023-11-13 PROCEDURE — 80048 BASIC METABOLIC PNL TOTAL CA: CPT

## 2023-12-23 NOTE — ED PROVIDER NOTE - DISPOSITION TYPE
Airway    Performed by: Erica Wright MD  Authorized by: Erica Wright MD    Final Airway Type:  Supraglottic airway  Final Supraglottic Airway:  Unique  SGA Size*:  4  Attempts*:  1   Patient Identified, Procedure confirmed, Emergency equipment available and Safety protocols followed  Location:  OR  Urgency:  Elective  Difficult Airway: No    Indications for Airway Management:  Anesthesia  Spontaneous Ventilation: absent    Sedation Level:  Anesthetized  Mask Difficulty Assessment:  0 - not attempted  Start Time: 12/23/2023 8:00 AM       ADMIT

## 2023-12-29 ENCOUNTER — APPOINTMENT (OUTPATIENT)
Dept: VASCULAR SURGERY | Facility: CLINIC | Age: 78
End: 2023-12-29

## 2024-08-01 NOTE — DISCHARGE NOTE NURSING/CASE MANAGEMENT/SOCIAL WORK - NSDCPNDISPN_GEN_ALL_CORE
08/01/24 0735   Appointment Info   Signing Clinician's Name / Credentials (OT) Loc Brown OTR/L   Quick Adds   Quick Adds Certification   Living Environment   People in Home alone   Current Living Arrangements house  (Rambler style home)   Home Accessibility stairs to enter home;stairs within home   Number of Stairs, Main Entrance 4   Number of Stairs, Within Home, Primary greater than 10 stairs  (to basement, laundry in basement)   Transportation Anticipated family or friend will provide  (Pt typically drives)   Living Environment Comments Pt will have assist upon discharge from a friend.   Self-Care   Usual Activity Tolerance good   Current Activity Tolerance moderate   Equipment Currently Used at Home walker, rolling;cane, straight;grab bar, tub/shower   Activity/Exercise/Self-Care Comment Independent in all ADLs and mobility at basement.   Instrumental Activities of Daily Living (IADL)   Previous Responsibilities housekeeping;meal prep;laundry;shopping;medication management;finances;driving   General Information   Onset of Illness/Injury or Date of Surgery 07/31/24   Referring Physician Tan Giordano MD   Patient/Family Therapy Goal Statement (OT) Home   Additional Occupational Profile Info/Pertinent History of Current Problem s/p RIGHT REVERSE TOTAL SHOULDER ARTHROPLASTY. WBAT. See chart for details.   Cognitive Status Examination   Orientation Status orientation to person, place and time   Pain Assessment   Patient Currently in Pain Yes, see Vital Sign flowsheet   Transfers   Transfers bed-chair transfer;sit-stand transfer;toilet transfer   Transfer Skill: Bed to Chair/Chair to Bed   Bed-Chair [place] (Transfers) set up;verbal cues  (SBA)   Sit-Stand Transfer   Sit-Stand [place] (Transfers) set up;verbal cues  (SBA)   Toilet Transfer   Type (Toilet Transfer) sit-stand;stand-sit   [place] Level (Toilet Transfer) set up;verbal cues  (SBA)   Activities of Daily Living   BADL  Assessment/Intervention upper body dressing;lower body dressing   Upper Body Dressing Assessment/Training   [place] Level (Upper Body Dressing) doff;don;pull-over garment;set up;verbal cues;minimum assist (75% patient effort)  (don/doff sling)   Lower Body Dressing Assessment/Training   [place] Level (Lower Body Dressing) don;pants/bottoms;set up;verbal cues  (SBA)   Clinical Impression   Criteria for Skilled Therapeutic Interventions Met (OT) Yes, treatment indicated   OT Diagnosis Decreased independence in I/ADls.   Influenced by the following impairments Decreased independence in I/ADls.   OT Problem List-Impairments impacting ADL problems related to;activity tolerance impaired;range of motion (ROM);strength;pain;post-surgical precautions   Assessment of Occupational Performance 1-3 Performance Deficits   Identified Performance Deficits Decreased independence in I/ADls. (Dressing, bathing, toileting)   Planned Therapy Interventions (OT) ADL retraining;IADL retraining;transfer training;home program guidelines;progressive activity/exercise   Clinical Decision Making Complexity (OT) problem focused assessment/low complexity   Risk & Benefits of therapy have been explained evaluation/treatment results reviewed;care plan/treatment goals reviewed;risks/benefits reviewed;participants included;patient   OT Total Evaluation Time   OT Eval, Low Complexity Minutes (93144) 7   Therapy Certification   Medical Diagnosis s/p RIGHT REVERSE TOTAL SHOULDER ARTHROPLASTY.   Start of Care Date 08/01/24   OT Goals   Therapy Frequency (OT) One time eval and treatment   OT Predicted Duration/Target Date for Goal Attainment 08/01/24   OT Goals Upper Body Dressing;Lower Body Dressing;Toilet Transfer/Toileting;OT Goal 1   OT: Upper Body Dressing Minimal assist;within precautions   OT: Lower Body Dressing Supervision/stand-by assist;within precautions   OT: Toilet Transfer/Toileting Supervision/stand-by assist;toilet  transfer;cleaning and garment management   OT: Goal 1 Pt will verbalize understanding of 2 shower transfer techniques, in order to increase IND in I/ADls.   Interventions   Interventions Quick Adds Self-Care/Home Management;Therapeutic Procedures/Exercise   Self-Care/Home Management   Self-Care/Home Mgmt/ADL, Compensatory, Meal Prep Minutes (63528) 23   Symptoms Noted During/After Treatment (Meal Preparation/Planning Training) fatigue   Treatment Detail/Skilled Intervention Pt ambulated to the bathroom with SBA and no AD and transferred to/from the toilet with SBA, after education on safe transfer technique. Educated on safe functional transfers and safe hand placement, pt demonstrated carryover after education. Educated on upper and lower body dressing with compensatory techniques. While seated/standing pt completed LE dressing (don underwear and pants) with SBA, after education on technique.  Pt  completed don pull over shirt and don/doff sling with minimum assist. Educated on  shower transfer and recommendations. Pt verbalized understanding of all education and reported plans to have assist upon discharge for I/ADLs.   Therapeutic Procedures/Exercise   Therapeutic Procedure: strength, endurance, ROM, flexibillity minutes (76070) 5   Symptoms Noted During/After Treatment fatigue   Treatment Detail/Skilled Intervention Educated on RUE exercises in order to increase independence in I/ADLs. Pt completed RUE exercises for 1 set of 5 reps each exercise. Exercises included elbow flexion/extension, pronation/supination, wrist flexion/extension/deviation, grasp and release. Pt demonstrated understanding of exercises. Handout provided.   OT Discharge Planning   OT Plan All IP OT goals met   OT Rationale for DC Rec Minimum assist for UE dressing and SBA for LE dressing. Assist in strenuous I/ADLs (bathing, shopping, home management tasks).   OT Brief overview of current status Minimum A UE dressing   Total Session Time    Timed Code Treatment Minutes 28   Total Session Time (sum of timed and untimed services) 35        [facility]  OUTPATIENT OCCUPATIONAL THERAPY  EVALUATION  PLAN OF TREATMENT FOR OUTPATIENT REHABILITATION  (COMPLETE FOR INITIAL CLAIMS ONLY)  Patient's Last Name, First Name, M.I.  YOB: 1956  TobiasCasi  MARIA DEL CARMEN                          Provider's Name  [facility] Medical Record No.  [id number]                             Onset Date:  07/31/24   Start of Care Date:  08/01/24   Type:     ___PT   _X_OT   ___SLP Medical Diagnosis:  s/p RIGHT REVERSE TOTAL SHOULDER ARTHROPLASTY.                    OT Diagnosis:  Decreased independence in I/ADls. Visits from SOC:  1     See note for plan of treatment, functional goals and certification details    I CERTIFY THE NEED FOR THESE SERVICES FURNISHED UNDER        THIS PLAN OF TREATMENT AND WHILE UNDER MY CARE     (Physician co-signature of this document indicates review and certification of the therapy plan).                                Opioids not applicable/not prescribed

## 2024-09-13 NOTE — ED CLERICAL - NS ED CLERK UNITS
WOUND CARE AFTER SKIN BIOPSY    1.  Leave the bandage on for 24 hours.  Then, you may get the biopsy site wet in the bath or shower- this makes removing the bandage easier.    2. After removing the bandage, gently cleanse the biopsy site 1-2 times daily  with soap and water, pat dry and apply vaseline and a new bandage to the area until it is healed.      3. Continue treating the biopsy site until fully healed.  This may take 2-3 weeks for head or neck sites.  This may take up to 3-6 weeks for trunk or extremity (arm or leg) sites.    4. If any bleeding occurs, hold FIRM pressure with clean tissue, gauze, or towel for 20 MINUTES (use timer). Do NOT release pressure to peek if the bleeding has stopped until 20 minutes have passed. If it is still bleeding, repeat this step for another 20 minutes. Call our office if you are not able to stop the bleeding.    5. Call our office immediately if any signs of infection at the biopsy site (such as pain 1 inch beyond the procedure site, green or yellow drainage, or red streaks from the site), bleeding, excessive swelling.  A thin pink rim surrounding the site is normal.    6. You will be notified of the results by phone call, e-message, or letter (or at a recheck appointment).  If 2 weeks have passed since your procedure and you have not received the results, please call our office.    7. If you had sutures placed, they will need to be removed in 5-7 days for the face, 7-10 days for the scalp, and 10-14 days for the trunk or extremities (arms or legs).     If you have any concerns regarding the procedure or healing process, please contact [phone] for assistance.   Daily use of sunscreen is important to avoid accumulated sun damage. This can add up over the years and cause problems later in life.  Using a sunscreen with a SPF of 30 or greater is recommended. It is important to re-apply every 2 hours to maintain this protection.  There are two main types of sunscreen:  chemical sunscreens and physical blockers. Chemical sunscreens absorb the sun's rays, whereas physical blockers block the sun from penetrating the skin.  Chemical sunscreens are more common because these tend to be better tolerated, blend in better with the skin, and do not leave your skin with a white film. Additionally, some patients can develop an allergy to components of chemical sunscreens. They also have a longer shelf life.    PHYSICAL BLOCKER SUNSCREENS  (active ingredient: zinc oxide and/or titanium dioxide):  Elta MD (UV replenish, UV elements, UV physical)  Blue Lizard  Vanicream Facial Moisturizer Broad Spectrum SPF 30  Neutrogena pure screen mineral UV tint  Neutrogena pure + free baby faces  Neutrogena Invisible Daily Defense lotion  Aveeno baby  CoTz (comes in tinted formulations)  Tizo  COOLA mineral    CHEMICAL BLOCKER SUNSCREENS  Chemical sunscreens: Sunscreen recommendations for daily use as moisturizer (Warning: Some patients can be allergic to this type of sunscreen so avoid if allergy):  Neutrogena Hydro Boost Hyaluronic Acid Moisturizer SPF 50  La Roche Posay Anthelios  La Roche Posay Toleriane Double Repair Face Moisturizer UV Broad Spectrum SPF 30  Sun Bum  EltaMD UV Clear Sunscreen Broad-Spectrum SPF 46 Untinted  COOLA  Supergoop Every. Single. Face. Watery Lotion SPF 50 or Unseen sunscreen or glow screen  Eucerin Daily Protection Face Lotion Broad Spectrum SPF 30    Best sunscreens for skin of color:   Colorscience SunForgettable Total Protection Face Shield Flex or Glow SPF 50 (various shades)   Black Girl Sunscreen SPF 30  SuperGoop unseen sunscreen SPF 40  SuperGoop Glow Oil SPF 50  SuperGoop Glowscreen (various shades)  Elta MD UV Clear SPF 46 (various shades)   Neutrogena Purescreen Mineral UV Tint SPF 30  COOLA Tinted moisturizer SPF 30 (various shades)  CeraVe Ultra Light spf 30  Neutrogena Invisible Daily Defense serum SPF 60  Neutrogena Hydro Boost Hyaluronic Acid Moisturizer SPF  50  Glossier Invisible Shield SPF 30    Sun Protection Tips:  - Wear UPF (50+) clothing for sun protection (Some brands: Coolibar, SunPrecautions, UV Skinz) These can be ordered online. You can also find UPF clothing on Amazon.com and at stores like [employer].  www.[employer]  [email or web address] Networks.[employer]  www.uvskinz.com  - Broad-brimmed hats  - Swimming in early morning or late evening   - Sunblocksunscreen SPF 30 or greater      APER

## 2025-06-25 ENCOUNTER — NON-APPOINTMENT (OUTPATIENT)
Age: 80
End: 2025-06-25

## 2025-06-25 ENCOUNTER — APPOINTMENT (OUTPATIENT)
Dept: CARDIOLOGY | Facility: CLINIC | Age: 80
End: 2025-06-25
Payer: MEDICARE

## 2025-06-25 VITALS
DIASTOLIC BLOOD PRESSURE: 70 MMHG | BODY MASS INDEX: 30.08 KG/M2 | SYSTOLIC BLOOD PRESSURE: 110 MMHG | HEART RATE: 62 BPM | OXYGEN SATURATION: 97 % | WEIGHT: 175.25 LBS

## 2025-06-25 PROBLEM — I83.90 VARICOSE VEINS OF CALF: Status: ACTIVE | Noted: 2025-06-25

## 2025-06-25 PROCEDURE — G2211 COMPLEX E/M VISIT ADD ON: CPT

## 2025-06-25 PROCEDURE — 99204 OFFICE O/P NEW MOD 45 MIN: CPT

## 2025-06-25 PROCEDURE — 93000 ELECTROCARDIOGRAM COMPLETE: CPT

## 2025-06-25 RX ORDER — ADHESIVE TAPE 3"X 2.3 YD
5 TAPE, NON-MEDICATED TOPICAL
Refills: 0 | Status: ACTIVE | COMMUNITY

## 2025-06-25 RX ORDER — DICYCLOMINE HYDROCHLORIDE 20 MG/1
20 TABLET ORAL
Refills: 0 | Status: ACTIVE | COMMUNITY

## 2025-06-25 RX ORDER — ATORVASTATIN CALCIUM 40 MG/1
40 TABLET, FILM COATED ORAL
Refills: 0 | Status: ACTIVE | COMMUNITY

## 2025-06-25 RX ORDER — ASPIRIN 81 MG
81 TABLET, DELAYED RELEASE (ENTERIC COATED) ORAL
Refills: 0 | Status: ACTIVE | COMMUNITY

## 2025-07-16 ENCOUNTER — APPOINTMENT (OUTPATIENT)
Dept: CARDIOLOGY | Facility: CLINIC | Age: 80
End: 2025-07-16
Payer: MEDICARE

## 2025-07-16 VITALS
OXYGEN SATURATION: 96 % | WEIGHT: 172 LBS | HEART RATE: 64 BPM | DIASTOLIC BLOOD PRESSURE: 80 MMHG | SYSTOLIC BLOOD PRESSURE: 120 MMHG | BODY MASS INDEX: 29.52 KG/M2

## 2025-07-16 PROBLEM — R55 SYNCOPE AND COLLAPSE: Status: ACTIVE | Noted: 2025-06-25

## 2025-07-16 PROBLEM — K55.1 SUPERIOR MESENTERIC ARTERY STENOSIS: Status: ACTIVE | Noted: 2025-06-25

## 2025-07-16 PROCEDURE — 93306 TTE W/DOPPLER COMPLETE: CPT

## 2025-07-16 PROCEDURE — G2211 COMPLEX E/M VISIT ADD ON: CPT

## 2025-07-16 PROCEDURE — 99213 OFFICE O/P EST LOW 20 MIN: CPT

## 2025-08-25 ENCOUNTER — APPOINTMENT (OUTPATIENT)
Dept: VASCULAR SURGERY | Facility: CLINIC | Age: 80
End: 2025-08-25

## 2025-08-25 ENCOUNTER — NON-APPOINTMENT (OUTPATIENT)
Age: 80
End: 2025-08-25

## 2025-08-25 VITALS
WEIGHT: 167 LBS | HEIGHT: 63 IN | TEMPERATURE: 98.4 F | SYSTOLIC BLOOD PRESSURE: 97 MMHG | DIASTOLIC BLOOD PRESSURE: 63 MMHG | BODY MASS INDEX: 29.59 KG/M2 | HEART RATE: 72 BPM

## 2025-08-25 VITALS — SYSTOLIC BLOOD PRESSURE: 98 MMHG | HEART RATE: 74 BPM | DIASTOLIC BLOOD PRESSURE: 58 MMHG

## 2025-08-25 PROCEDURE — 93970 EXTREMITY STUDY: CPT

## 2025-08-25 PROCEDURE — 99204 OFFICE O/P NEW MOD 45 MIN: CPT

## (undated) DEVICE — BALLOON US ENDO

## (undated) DEVICE — SUCTION YANKAUER NO CONTROL VENT

## (undated) DEVICE — BIOPSY FORCEP RADIAL JAW 4 STANDARD WITH NEEDLE

## (undated) DEVICE — FOLEY HOLDER STATLOCK 2 WAY ADULT

## (undated) DEVICE — PACK IV START WITH CHG

## (undated) DEVICE — TUBING SUCTION 20FT

## (undated) DEVICE — BITE BLOCK ADULT 20 X 27MM (GREEN)

## (undated) DEVICE — CATH IV SAFE BC 20G X 1.16" (PINK)

## (undated) DEVICE — TUBING IV SET GRAVITY 3Y 100" MACRO

## (undated) DEVICE — CATH IV SAFE BC 22G X 1" (BLUE)

## (undated) DEVICE — TUBING SUCTION CONN 6FT STERILE

## (undated) DEVICE — SYR ALLIANCE II INFLATION 60ML

## (undated) DEVICE — SOL INJ NS 0.9% 500ML 2 PORT

## (undated) DEVICE — SENSOR O2 FINGER ADULT